# Patient Record
Sex: MALE | Race: WHITE | NOT HISPANIC OR LATINO | Employment: OTHER | URBAN - NONMETROPOLITAN AREA
[De-identification: names, ages, dates, MRNs, and addresses within clinical notes are randomized per-mention and may not be internally consistent; named-entity substitution may affect disease eponyms.]

---

## 2017-04-11 ENCOUNTER — OFFICE VISIT (OUTPATIENT)
Dept: CARDIOLOGY | Facility: CLINIC | Age: 82
End: 2017-04-11

## 2017-04-11 VITALS
HEIGHT: 70 IN | WEIGHT: 190 LBS | SYSTOLIC BLOOD PRESSURE: 120 MMHG | DIASTOLIC BLOOD PRESSURE: 62 MMHG | HEART RATE: 65 BPM | BODY MASS INDEX: 27.2 KG/M2

## 2017-04-11 DIAGNOSIS — I49.5 SSS (SICK SINUS SYNDROME) (HCC): Primary | ICD-10-CM

## 2017-04-11 DIAGNOSIS — I25.9 IHD (ISCHEMIC HEART DISEASE): ICD-10-CM

## 2017-04-11 DIAGNOSIS — R06.02 SHORTNESS OF BREATH: ICD-10-CM

## 2017-04-11 DIAGNOSIS — E78.49 OTHER HYPERLIPIDEMIA: ICD-10-CM

## 2017-04-11 DIAGNOSIS — I10 ESSENTIAL HYPERTENSION: ICD-10-CM

## 2017-04-11 PROBLEM — E78.5 HYPERLIPEMIA: Status: ACTIVE | Noted: 2017-04-11

## 2017-04-11 PROCEDURE — 93000 ELECTROCARDIOGRAM COMPLETE: CPT | Performed by: NURSE PRACTITIONER

## 2017-04-11 PROCEDURE — 99214 OFFICE O/P EST MOD 30 MIN: CPT | Performed by: NURSE PRACTITIONER

## 2017-04-11 RX ORDER — ASPIRIN 81 MG/1
81 TABLET ORAL DAILY
COMMUNITY
End: 2017-11-09 | Stop reason: ALTCHOICE

## 2017-04-11 RX ORDER — PHENOL 1.4 %
AEROSOL, SPRAY (ML) MUCOUS MEMBRANE DAILY
COMMUNITY
End: 2018-04-17 | Stop reason: ALTCHOICE

## 2017-04-11 RX ORDER — SIMVASTATIN 20 MG
20 TABLET ORAL 2 TIMES DAILY
COMMUNITY
End: 2017-10-17 | Stop reason: ALTCHOICE

## 2017-04-11 NOTE — PROGRESS NOTES
Chief Complaint   Patient presents with   • Follow-up     Denies chest pain or palpitations. Most recent labs from June of last year in patient chart. PCP refills meds.    • Device check     Xtime PPM last checked on 11/09/16.    • Fatigue     Gets tired easily. Has progressively gotten worse.    • Shortness of Breath     With exertion. About the same as before.        Cardiac Complaints  dyspnea and fatigue      Subjective   Collin Coyle is a 81 y.o. male with a history of SVT and sick sinus syndrome for which he underwent ablation by Dr. Canales in 2004. He later underwent pacemaker placement by Dr. Sims in 2011.. He also has ischemic heart disease for which he had stent placement in 2004. His last stress test and echocardiogram in 2015 did not show ischemic burden and his ejection fraction was in normal range.  Most recent pacer check in November showed about 14 mode switches, normal function, and about 2 years of battery life remaining.  He returns today reporting more issues with fatigue.  He says he gets more fatigued and is not able to be as active.  He does also report shortness of breath but states it has been the same for sometime.  No chest pain or palpitations reported.  No recent blood work.  No refills needed.      Cardiac History  Past Surgical History:   Procedure Laterality Date   • CARDIAC ABLATION  2004    Dr. Canales   • CARDIAC CATHETERIZATION  06/29/2004    70% LAD. RTA & 2.5x20, 3.0x20 taxus in LAD   • CARDIOVASCULAR STRESS TEST  04/11/2007    7 min, 93% THR. Negative   • CARDIOVASCULAR STRESS TEST  07/14/2009    7 min, 89% THR. Negative   • CARDIOVASCULAR STRESS TEST  09/12/2012    6 min, 97% THR, 140/72, small inferior infarct   • CARDIOVASCULAR STRESS TEST  10/19/2015    EF 65%, no ischemic burden   • CONVERTED (HISTORICAL) HOLTER  07/11/2003    NSR-72 bpm, one short run of SVT noted   • CONVERTED (HISTORICAL) HOLTER  03/03/2011    AVG HR 59 bpm. 213 pauses   • ECHO -  CONVERTED  10/06/2008    EF >60%, AO root- 4.0cm   • ECHO - CONVERTED  07/14/2009    EF >60%, PA pressure 40.29 mmHg, AO root 4.3 cm   • ECHO - CONVERTED  06/08/2011    EF 50-55%, mild AR and MR, RVSP 40.47 mmHg   • ECHO - CONVERTED  10/19/2015    EF 50-55%, mild MR, mild AR, AO 3.8 cm2   • PACEMAKER IMPLANTATION  03/17/2011    [employer]. Dr. Sims.    • US CAROTID UNILATERAL  04/05/2013    no hemodynamically sig stenosis       Current Outpatient Prescriptions   Medication Sig Dispense Refill   • aspirin 81 MG EC tablet Take 81 mg by mouth Daily.     • Melatonin 10 MG tablet Take  by mouth Daily.     • metoprolol tartrate (LOPRESSOR) 25 MG tablet Take 25 mg by mouth 2 (Two) Times a Day.     • Multiple Vitamin (MULTI VITAMIN PO) Take  by mouth Daily.     • simvastatin (ZOCOR) 20 MG tablet Take 20 mg by mouth 2 (Two) Times a Day.       No current facility-administered medications for this visit.        Review of patient's allergies indicates no known allergies.    Past Medical History:   Diagnosis Date   • H/O cataract extraction     bilateral   • H/O hernia repair    • Hx of appendectomy    • Hypercholesterolemia    • Hyperlipidemia    • Hypertension    • IHD (ischemic heart disease)        Social History     Social History   • Marital status:      Spouse name: N/A   • Number of children: N/A   • Years of education: N/A     Occupational History   • Not on file.     Social History Main Topics   • Smoking status: Former Smoker     Quit date: 1963   • Smokeless tobacco: Never Used   • Alcohol use No   • Drug use: No   • Sexual activity: Not on file     Other Topics Concern   • Not on file     Social History Narrative       Family History   Problem Relation Age of Onset   • Heart disease Mother    • Hyperlipidemia Mother    • Hypertension Mother    • Cancer Father    • Heart disease Sister        Review of Systems   Constitutional: Positive for fatigue.   HENT: Negative.    Respiratory: Positive for  "shortness of breath.    Cardiovascular: Negative.    Musculoskeletal: Negative.    Skin: Negative.    Neurological: Negative.    Psychiatric/Behavioral: Negative.        DiabetesNo  Thyroidnormal    Objective     /62  Pulse 65  Ht 70\" (177.8 cm)  Wt 190 lb (86.2 kg)  BMI 27.26 kg/m2    Physical Exam   Constitutional: He is oriented to person, place, and time. He appears well-developed and well-nourished.   HENT:   Head: Normocephalic and atraumatic.   Eyes: EOM are normal. Pupils are equal, round, and reactive to light.   Neck: Normal range of motion. Neck supple.   Cardiovascular: Normal rate and regular rhythm.    Murmur heard.  Pulmonary/Chest: Effort normal and breath sounds normal.   Abdominal: Soft.   Musculoskeletal: Normal range of motion.   Neurological: He is alert and oriented to person, place, and time.   Skin: Skin is warm and dry.   Psychiatric: He has a normal mood and affect.         ECG 12 Lead  Date/Time: 4/11/2017 1:56 PM  Performed by: ARIANNE LONGO  Authorized by: ARIANNE LONGO   Rhythm: paced  BPM: 65  Clinical impression: abnormal ECG          Assessment/Plan     HR is stable today as well as blood pressure.  EKG done for SSS and PPM shows AV pacing.  We will advise on repeat pacer check for next available.  Echo will be advised to re-assess LV function and for any valvular concerns that may be causing shortness of breath or fatigue.  Labs with you, could we get next copy?  No refills are needed. More recommendations to follow testing.  Good cardiac diet and activity as tolerated advised.  6 month follow up advised or sooner if needed.      Problems Addressed this Visit        Cardiovascular and Mediastinum    IHD (ischemic heart disease)    Relevant Medications    metoprolol tartrate (LOPRESSOR) 25 MG tablet    Other Relevant Orders    ECG 12 Lead    Adult Transthoracic Echo Complete    SSS (sick sinus syndrome) - Primary    Relevant Medications    metoprolol tartrate " (LOPRESSOR) 25 MG tablet    Other Relevant Orders    ECG 12 Lead    HTN (hypertension)    Relevant Medications    metoprolol tartrate (LOPRESSOR) 25 MG tablet    Hyperlipemia    Relevant Medications    simvastatin (ZOCOR) 20 MG tablet      Other Visit Diagnoses     Shortness of breath        Relevant Orders    Adult Transthoracic Echo Complete              Electronically signed by NANO Pitt April 11, 2017 5:24 PM

## 2017-04-17 ENCOUNTER — OUTSIDE FACILITY SERVICE (OUTPATIENT)
Dept: CARDIOLOGY | Facility: CLINIC | Age: 82
End: 2017-04-17

## 2017-04-17 ENCOUNTER — HOSPITAL ENCOUNTER (OUTPATIENT)
Dept: CARDIOLOGY | Facility: HOSPITAL | Age: 82
Discharge: HOME | End: 2017-04-17
Admitting: NURSE PRACTITIONER

## 2017-04-17 DIAGNOSIS — I25.9 IHD (ISCHEMIC HEART DISEASE): ICD-10-CM

## 2017-04-17 DIAGNOSIS — R06.02 SHORTNESS OF BREATH: ICD-10-CM

## 2017-04-17 PROCEDURE — 93306 TTE W/DOPPLER COMPLETE: CPT

## 2017-04-17 PROCEDURE — 93306 TTE W/DOPPLER COMPLETE: CPT | Performed by: INTERNAL MEDICINE

## 2017-04-26 ENCOUNTER — OFFICE VISIT (OUTPATIENT)
Dept: CARDIOLOGY | Facility: CLINIC | Age: 82
End: 2017-04-26

## 2017-04-26 DIAGNOSIS — I25.9 IHD (ISCHEMIC HEART DISEASE): Primary | ICD-10-CM

## 2017-04-26 DIAGNOSIS — I49.5 SSS (SICK SINUS SYNDROME) (HCC): ICD-10-CM

## 2017-04-26 PROCEDURE — 93288 INTERROG EVL PM/LDLS PM IP: CPT | Performed by: INTERNAL MEDICINE

## 2017-10-17 ENCOUNTER — OFFICE VISIT (OUTPATIENT)
Dept: CARDIOLOGY | Facility: CLINIC | Age: 82
End: 2017-10-17

## 2017-10-17 VITALS
DIASTOLIC BLOOD PRESSURE: 80 MMHG | HEART RATE: 65 BPM | WEIGHT: 185 LBS | HEIGHT: 70 IN | SYSTOLIC BLOOD PRESSURE: 122 MMHG | BODY MASS INDEX: 26.48 KG/M2

## 2017-10-17 DIAGNOSIS — Z98.890 H/O CARDIAC RADIOFREQUENCY ABLATION: ICD-10-CM

## 2017-10-17 DIAGNOSIS — R01.1 CARDIAC MURMUR: ICD-10-CM

## 2017-10-17 DIAGNOSIS — I10 ESSENTIAL HYPERTENSION: ICD-10-CM

## 2017-10-17 DIAGNOSIS — I25.9 IHD (ISCHEMIC HEART DISEASE): Primary | ICD-10-CM

## 2017-10-17 DIAGNOSIS — E78.49 OTHER HYPERLIPIDEMIA: ICD-10-CM

## 2017-10-17 DIAGNOSIS — Z78.9 PACED RHYTHM ON ELECTROCARDIOGRAM (ECG): ICD-10-CM

## 2017-10-17 DIAGNOSIS — Z95.0 PACEMAKER: ICD-10-CM

## 2017-10-17 DIAGNOSIS — I49.5 SSS (SICK SINUS SYNDROME) (HCC): ICD-10-CM

## 2017-10-17 DIAGNOSIS — R06.02 SHORTNESS OF BREATH: ICD-10-CM

## 2017-10-17 PROCEDURE — 93000 ELECTROCARDIOGRAM COMPLETE: CPT | Performed by: NURSE PRACTITIONER

## 2017-10-17 PROCEDURE — 99214 OFFICE O/P EST MOD 30 MIN: CPT | Performed by: NURSE PRACTITIONER

## 2017-10-17 RX ORDER — SIMVASTATIN 20 MG
20 TABLET ORAL NIGHTLY
COMMUNITY
End: 2018-04-17 | Stop reason: SDUPTHER

## 2017-10-17 NOTE — PROGRESS NOTES
Chief Complaint   Patient presents with   • Follow-up     For cardiac management. Says he occasionally gets short of breath.    • Med Refill     Does not need refills.        Geena Coyle is a 82 y.o. male with a history of SVT and sick sinus syndrome for which he underwent ablation by Dr. Canales in 2004. He later underwent pacemaker placement by Dr. Sims in 2011.. He also has ischemic heart disease for which he had stent placement in 2004. His l stress test and echocardiogram in 2015 did not show ischemic burden and his ejection fraction was in normal range.  Pacer check in April 2017 showed normal function with no events and 1.5  years of battery life remaining.   Today he comes to the office for a follow up visit and denies chest pain. He has shortness of breath with exertion, no worse. He continues his normal activities without new or worsening symptoms. No recent medication changes reported.     HPI         Cardiac History:    Past Surgical History:   Procedure Laterality Date   • CARDIAC ABLATION  2004    Dr. Canales   • CARDIAC CATHETERIZATION  06/29/2004    70% LAD. RTA & 2.5x20, 3.0x20 taxus in LAD   • CARDIOVASCULAR STRESS TEST  04/11/2007    7 min, 93% THR. Negative   • CARDIOVASCULAR STRESS TEST  07/14/2009    7 min, 89% THR. Negative   • CARDIOVASCULAR STRESS TEST  09/12/2012    6 min, 97% THR, 140/72, small inferior infarct   • CARDIOVASCULAR STRESS TEST  10/19/2015    EF 65%, no ischemic burden   • CONVERTED (HISTORICAL) HOLTER  07/11/2003    NSR-72 bpm, one short run of SVT noted   • CONVERTED (HISTORICAL) HOLTER  03/03/2011    AVG HR 59 bpm. 213 pauses   • ECHO - CONVERTED  10/06/2008    EF >60%, AO root- 4.0cm   • ECHO - CONVERTED  07/14/2009    EF >60%, PA pressure 40.29 mmHg, AO root 4.3 cm   • ECHO - CONVERTED  06/08/2011    EF 50-55%, mild AR and MR, RVSP 40.47 mmHg   • ECHO - CONVERTED  10/19/2015    EF 50-55%, mild MR, mild AR, AO 3.8 cm2   • ECHO - CONVERTED   04/17/2017    EF 50-55%, mild MR, RVSP 26 mmHg   • PACEMAKER IMPLANTATION  03/17/2011    [employer]. Dr. Sims.    • US CAROTID UNILATERAL  04/05/2013    no hemodynamically sig stenosis       Current Outpatient Prescriptions   Medication Sig Dispense Refill   • Ascorbic Acid (VITAMIN C PO) Take 200 mg by mouth Daily.     • aspirin 81 MG EC tablet Take 81 mg by mouth Daily.     • Melatonin 10 MG tablet Take  by mouth Daily.     • metoprolol tartrate (LOPRESSOR) 25 MG tablet Take 25 mg by mouth 2 (Two) Times a Day.     • Multiple Vitamin (MULTI VITAMIN PO) Take  by mouth Daily.     • simvastatin (ZOCOR) 20 MG tablet Take 20 mg by mouth Every Night.       No current facility-administered medications for this visit.        Review of patient's allergies indicates no known allergies.    Past Medical History:   Diagnosis Date   • H/O cataract extraction     bilateral   • H/O hernia repair    • Hx of appendectomy    • Hypercholesterolemia    • Hyperlipidemia    • Hypertension    • IHD (ischemic heart disease)        Social History     Social History   • Marital status:      Spouse name: N/A   • Number of children: N/A   • Years of education: N/A     Occupational History   • Not on file.     Social History Main Topics   • Smoking status: Former Smoker     Quit date: 1963   • Smokeless tobacco: Never Used   • Alcohol use No   • Drug use: No   • Sexual activity: Not on file     Other Topics Concern   • Not on file     Social History Narrative       Family History   Problem Relation Age of Onset   • Heart disease Mother    • Hyperlipidemia Mother    • Hypertension Mother    • Cancer Father    • Heart disease Sister        Review of Systems   Constitution: Negative for decreased appetite and malaise/fatigue.   HENT: Negative for congestion and sore throat.    Eyes: Negative for blurred vision.   Cardiovascular: Negative for chest pain, claudication, dyspnea on exertion, leg swelling, near-syncope, palpitations and  "paroxysmal nocturnal dyspnea.   Respiratory: Positive for shortness of breath. Negative for cough and sleep disturbances due to breathing.    Endocrine: Negative for cold intolerance and heat intolerance.   Hematologic/Lymphatic: Negative for adenopathy. Does not bruise/bleed easily.   Skin: Negative for itching, nail changes and skin cancer.   Musculoskeletal: Negative for arthritis and myalgias.   Gastrointestinal: Negative for abdominal pain, dysphagia, heartburn and melena.   Genitourinary: Negative for dysuria and hematuria.   Neurological: Negative for dizziness, light-headedness, seizures and vertigo.   Psychiatric/Behavioral: Negative for altered mental status and memory loss. The patient does not have insomnia.    Allergic/Immunologic: Negative for environmental allergies and hives.        Diabetes- No  Thyroid-normal    Objective     /80 (BP Location: Left arm)  Pulse 65  Ht 70\" (177.8 cm)  Wt 185 lb (83.9 kg)  BMI 26.54 kg/m2    Physical Exam   Constitutional: He is oriented to person, place, and time. He appears well-nourished.   HENT:   Head: Normocephalic.   Eyes: Conjunctivae are normal. Pupils are equal, round, and reactive to light.   Neck: Normal range of motion. Neck supple. No JVD present.   Cardiovascular: Normal rate, regular rhythm, S1 normal, S2 normal and normal pulses.    Murmur heard.   Blowing holosystolic murmur is present with a grade of 2/6   Pulmonary/Chest: Effort normal and breath sounds normal. He has no wheezes. He has no rales.   Abdominal: Soft. Bowel sounds are normal. He exhibits no distension. There is no tenderness.   Musculoskeletal: Normal range of motion. He exhibits no edema.   Neurological: He is alert and oriented to person, place, and time.   Skin: Skin is warm and dry. No rash noted. No pallor.   Psychiatric: He has a normal mood and affect. His behavior is normal. Judgment and thought content normal.   Vitals reviewed.     ECG 12 Lead  Date/Time: " 10/17/2017 8:53 AM  Performed by: MASON TEJEDA  Authorized by: MASON TEJEDA   Comparison: compared with previous ECG from 2017  Comparison to previous ECG: Atrial and ventricular paced  Rhythm: paced  Rate: normal  BPM: 65  Pacin% capture  Clinical impression: abnormal ECG  Comments: A-V paced                Assessment/Plan      Collin was seen today for follow-up and med refill.    Diagnoses and all orders for this visit:    IHD (ischemic heart disease)    SSS (sick sinus syndrome)    Essential hypertension    Other hyperlipidemia    Pacemaker    Paced rhythm on electrocardiogram (ECG)    Shortness of breath    H/O cardiac radiofrequency ablation    Cardiac murmur    Other orders  -     ECG 12 Lead      For management of pacemaker an EKG done today that shows atrial and ventricular pacing. A Single Digits ppm interrogation scheduled for next month. His blood pressure is normal. He brought a copy of April lab report which shows normal electrolytes and blood count. Lipids are at goal. Same cardiac medications recommended. His BMI is 26. Goal weight 180. I encouraged him on 5-10 pound weight loss and low fat diet. He maintains good activity and I encouraged him to continue. The report of his most recent echocardiogram done in April was reviewed which showed LV ejection fraction remains 50-55^% and PA pressure improved being 26. No further cardiac testing advised at this time. Should any problems or symptoms develop he understands to call.              Electronically signed by NANO Arzate,  2017 11:10 AM

## 2017-11-08 ENCOUNTER — OFFICE VISIT (OUTPATIENT)
Dept: CARDIOLOGY | Facility: CLINIC | Age: 82
End: 2017-11-08

## 2017-11-08 DIAGNOSIS — I49.5 SSS (SICK SINUS SYNDROME) (HCC): ICD-10-CM

## 2017-11-08 DIAGNOSIS — I48.0 PAF (PAROXYSMAL ATRIAL FIBRILLATION) (HCC): Primary | ICD-10-CM

## 2017-11-08 PROCEDURE — 93288 INTERROG EVL PM/LDLS PM IP: CPT | Performed by: INTERNAL MEDICINE

## 2017-11-09 ENCOUNTER — TELEPHONE (OUTPATIENT)
Dept: CARDIOLOGY | Facility: CLINIC | Age: 82
End: 2017-11-09

## 2017-11-09 NOTE — TELEPHONE ENCOUNTER
Pt here for a PPM check, was in AFib, per Cheryle De Guzman, APRN, start Eliquis 5 mg BID, stop ASA and come in for EKG Friday.  Pt aware. Samples given.

## 2017-11-10 ENCOUNTER — TELEPHONE (OUTPATIENT)
Dept: CARDIOLOGY | Facility: CLINIC | Age: 82
End: 2017-11-10

## 2017-11-10 ENCOUNTER — CLINICAL SUPPORT (OUTPATIENT)
Dept: CARDIOLOGY | Facility: CLINIC | Age: 82
End: 2017-11-10

## 2017-11-10 DIAGNOSIS — Z79.899 MEDICATION MANAGEMENT: ICD-10-CM

## 2017-11-10 DIAGNOSIS — Z95.0 PACEMAKER: Primary | ICD-10-CM

## 2017-11-10 DIAGNOSIS — I48.0 PAF (PAROXYSMAL ATRIAL FIBRILLATION) (HCC): ICD-10-CM

## 2017-11-10 PROCEDURE — 93000 ELECTROCARDIOGRAM COMPLETE: CPT | Performed by: NURSE PRACTITIONER

## 2017-11-10 NOTE — PROGRESS NOTES
Procedure     ECG 12 Lead  Date/Time: 11/10/2017 10:32 AM  Performed by: MASON TEJEDA  Authorized by: MASON TEJEDA   Rhythm: paced  BPM: 65  Comments: Atrial and ventricular pacing

## 2017-11-10 NOTE — TELEPHONE ENCOUNTER
Mr. Coyle had ppm check Wednesday that showed PAF. Eliquis started.   Follow up EKG today shows A-V pacing. /80.  Continue same?

## 2017-11-27 ENCOUNTER — TELEPHONE (OUTPATIENT)
Dept: CARDIOLOGY | Facility: CLINIC | Age: 82
End: 2017-11-27

## 2017-11-27 NOTE — TELEPHONE ENCOUNTER
Received request from patient's spouse for Samples of Eliquis 5mg bid. Samples of Eliquis 5mg # 28 tablet's prepared for patient to .

## 2017-12-18 ENCOUNTER — TELEPHONE (OUTPATIENT)
Dept: CARDIOLOGY | Facility: CLINIC | Age: 82
End: 2017-12-18

## 2017-12-18 NOTE — TELEPHONE ENCOUNTER
Patient requesting samples of Eliquis 5mg bid and requesting refill to pharmacy on Eliquis 5mg bid. Samples of Eliquis 5mg ready for -42 tablets and script for Eliquis 5mg bid sent to pharmacy.

## 2018-01-05 ENCOUNTER — TELEPHONE (OUTPATIENT)
Dept: CARDIOLOGY | Facility: CLINIC | Age: 83
End: 2018-01-05

## 2018-01-05 NOTE — TELEPHONE ENCOUNTER
Patient's wife called requesting samples of Eliquis 5 mg BID. 28 were prepared and are ready for .

## 2018-01-29 ENCOUNTER — TELEPHONE (OUTPATIENT)
Dept: CARDIOLOGY | Facility: CLINIC | Age: 83
End: 2018-01-29

## 2018-01-29 NOTE — TELEPHONE ENCOUNTER
Patient called requesting samples of Eliquis 5mg bid, samples of Eliquis 5mg ready for pickup-28 tablets.

## 2018-02-26 ENCOUNTER — TELEPHONE (OUTPATIENT)
Dept: CARDIOLOGY | Facility: CLINIC | Age: 83
End: 2018-02-26

## 2018-02-26 NOTE — TELEPHONE ENCOUNTER
Patient called requesting refill on Eliquis 5mg bid sent to [facility] mail order pharmacy. 90 day Refill on Eliquis 5mg bid sent to pharmacy.

## 2018-04-09 ENCOUNTER — TELEPHONE (OUTPATIENT)
Dept: CARDIOLOGY | Facility: CLINIC | Age: 83
End: 2018-04-09

## 2018-04-13 NOTE — PROGRESS NOTES
Chief Complaint   Patient presents with   • Follow-up     For cardiac management. Last lab work was done in 01/08/18 per PCP, in chart under media.   • Med Refill     Needs refills on cardiac medications. 90 day supplys to [facility].    • Pacemaker Check     Last [facility] PPM check on 11/08/17 per our office.        Cardiac Complaints  none      Subjective   Collin Coyle is a 82 y.o. male with HTN, hyperlipidemia, PAF,  SVT, and sick sinus syndrome for which he underwent ablation by Dr. Canales in 2004. He later underwent pacemaker placement by Dr. Sims in 2011. For ischemic heart disease for which he had stent placement in 2004. His stress test in 2015 showed no ischemic burden.  Most recent echo in 2017 showed good LV function and only mild MR.  Pacer check in November of 2017 showed 5 episodes of AMS and eliquis was added, one year of battery life remaining.  Most recent labs from January 2018 show H/H 14.2/44.9, creatinine 1.01, K 4.2, Na 145, LDL 51, and HDL 43.  He returns today for follow up and denies any cardiac concerns.  He denies any new concerns.  He does report some shortness of breath with exertion same as it has been like this for years.  Refills of cardiac meds requested for 90 day supply.        Cardiac History  Past Surgical History:   Procedure Laterality Date   • CARDIAC ABLATION  2004    Dr. Canales   • CARDIAC CATHETERIZATION  06/29/2004    70% LAD. RTA & 2.5x20, 3.0x20 taxus in LAD   • CARDIOVASCULAR STRESS TEST  04/11/2007    7 min, 93% THR. Negative   • CARDIOVASCULAR STRESS TEST  07/14/2009    7 min, 89% THR. Negative   • CARDIOVASCULAR STRESS TEST  09/12/2012    6 min, 97% THR, 140/72, small inferior infarct   • CARDIOVASCULAR STRESS TEST  10/19/2015    EF 65%, no ischemic burden   • CONVERTED (HISTORICAL) HOLTER  07/11/2003    NSR-72 bpm, one short run of SVT noted   • CONVERTED (HISTORICAL) HOLTER  03/03/2011    AVG HR 59 bpm. 213 pauses   • ECHO - CONVERTED  10/06/2008    EF  >60%, AO root- 4.0cm   • ECHO - CONVERTED  07/14/2009    EF >60%, PA pressure 40.29 mmHg, AO root 4.3 cm   • ECHO - CONVERTED  06/08/2011    EF 50-55%, mild AR and MR, RVSP 40.47 mmHg   • ECHO - CONVERTED  10/19/2015    EF 50-55%, mild MR, mild AR, AO 3.8 cm2   • ECHO - CONVERTED  04/17/2017    EF 50-55%, mild MR, RVSP 26 mmHg   • PACEMAKER IMPLANTATION  03/17/2011    [employer]. Dr. Sims.    • US CAROTID UNILATERAL  04/05/2013    no hemodynamically sig stenosis       Current Outpatient Prescriptions   Medication Sig Dispense Refill   • Ascorbic Acid (VITAMIN C PO) Take 200 mg by mouth Daily.     • metoprolol tartrate (LOPRESSOR) 25 MG tablet Take 1 tablet by mouth Every 12 (Twelve) Hours. 180 tablet 3   • Multiple Vitamin (MULTI VITAMIN PO) Take  by mouth Daily.     • simvastatin (ZOCOR) 20 MG tablet Take 1 tablet by mouth Every Night. 90 tablet 3   • apixaban (ELIQUIS) 5 MG tablet tablet Take 1 tablet by mouth 2 (Two) Times a Day. 28 tablet 0     No current facility-administered medications for this visit.        Review of patient's allergies indicates no known allergies.    Past Medical History:   Diagnosis Date   • H/O cataract extraction     bilateral   • H/O hernia repair    • Hx of appendectomy    • Hypercholesterolemia    • Hyperlipidemia    • Hypertension    • IHD (ischemic heart disease)        Social History     Social History   • Marital status:      Spouse name: N/A   • Number of children: N/A   • Years of education: N/A     Occupational History   • Not on file.     Social History Main Topics   • Smoking status: Former Smoker     Quit date: 1963   • Smokeless tobacco: Never Used   • Alcohol use No   • Drug use: No   • Sexual activity: Not on file     Other Topics Concern   • Not on file     Social History Narrative   • No narrative on file       Family History   Problem Relation Age of Onset   • Heart disease Mother    • Hyperlipidemia Mother    • Hypertension Mother    • Cancer Father   "  • Heart disease Sister        Review of Systems   Constitution: Negative for weakness and malaise/fatigue.   Cardiovascular: Negative for chest pain, claudication, dyspnea on exertion, irregular heartbeat, near-syncope, palpitations and syncope.   Respiratory: Negative for shortness of breath and wheezing.    Musculoskeletal: Negative for back pain, joint pain and joint swelling.   Gastrointestinal: Negative for anorexia, heartburn and nausea.   Genitourinary: Negative for dysuria, hesitancy and nocturia.   Neurological: Negative for dizziness, light-headedness and loss of balance.   Psychiatric/Behavioral: Negative for depression and memory loss. The patient is not nervous/anxious.            Objective     /72 (BP Location: Left arm)   Pulse 65   Ht 177.8 cm (70\")   Wt 83 kg (183 lb)   BMI 26.26 kg/m²     Physical Exam   Constitutional: He is oriented to person, place, and time. He appears well-developed and well-nourished.   HENT:   Head: Normocephalic and atraumatic.   Eyes: EOM are normal. Pupils are equal, round, and reactive to light.   Neck: Normal range of motion. Neck supple.   Cardiovascular: Normal rate and regular rhythm.    Murmur heard.  Pulmonary/Chest: Effort normal and breath sounds normal.   Abdominal: Soft.   Musculoskeletal: Normal range of motion.   Neurological: He is alert and oriented to person, place, and time.   Skin: Skin is warm and dry.   Psychiatric: He has a normal mood and affect. His behavior is normal.         ECG 12 Lead  Date/Time: 4/17/2018 10:06 AM  Performed by: ARIANNE LONGO  Authorized by: ARIANNE LONGO   Rhythm: paced  BPM: 65  Clinical impression: abnormal ECG  Comments: Normal QT            Assessment/Plan     HR is stable today.  HTN well controlled on current.  EKG done today for SSS and PPM shows AV pacing and normal QT.  Palpitations are denied. Eliquis recently added for what appeared to be atrial fibrillation on most recent pacer check from " November. Patient denies any bleeding/bruising since addition of eliquis therapy.  Most recent labs reviewed, thank you for most recent copy.  Current zocor dosing will be continued for hyperlipidemia management. Refills of cardiac meds sent per request.  Repeat [place] PPM check advised.  Most recent echo findings from 2017 discussed with patient.  No new cardiac workup advised today as no new concerns are voiced and cardiac status appears stable.  BMI slightly elevated at 26.  Good cardiac diet with limited caloric intake and walking regimen encouraged.  6 month follow up scheduled or sooner if needed.      Problems Addressed this Visit        Cardiovascular and Mediastinum    IHD (ischemic heart disease)    Relevant Medications    metoprolol tartrate (LOPRESSOR) 25 MG tablet    SSS (sick sinus syndrome)    Relevant Medications    metoprolol tartrate (LOPRESSOR) 25 MG tablet    Other Relevant Orders    ECG 12 Lead    HTN (hypertension)    Relevant Medications    metoprolol tartrate (LOPRESSOR) 25 MG tablet    Hyperlipemia    Relevant Medications    simvastatin (ZOCOR) 20 MG tablet    Pacemaker    PAF (paroxysmal atrial fibrillation) - Primary    Relevant Medications    metoprolol tartrate (LOPRESSOR) 25 MG tablet    Other Relevant Orders    ECG 12 Lead      Other Visit Diagnoses     Overweight              Patient's Body mass index is 26.26 kg/m². BMI is above normal parameters. Follow-up plan includes:  nutrition counseling.              Electronically signed by NANO Pitt April 17, 2018 2:36 PM

## 2018-04-17 ENCOUNTER — TELEPHONE (OUTPATIENT)
Dept: CARDIOLOGY | Facility: CLINIC | Age: 83
End: 2018-04-17

## 2018-04-17 ENCOUNTER — OFFICE VISIT (OUTPATIENT)
Dept: CARDIOLOGY | Facility: CLINIC | Age: 83
End: 2018-04-17

## 2018-04-17 VITALS
DIASTOLIC BLOOD PRESSURE: 72 MMHG | HEIGHT: 70 IN | HEART RATE: 65 BPM | WEIGHT: 183 LBS | SYSTOLIC BLOOD PRESSURE: 130 MMHG | BODY MASS INDEX: 26.2 KG/M2

## 2018-04-17 DIAGNOSIS — I49.5 SSS (SICK SINUS SYNDROME) (HCC): ICD-10-CM

## 2018-04-17 DIAGNOSIS — E66.3 OVERWEIGHT: ICD-10-CM

## 2018-04-17 DIAGNOSIS — I25.9 IHD (ISCHEMIC HEART DISEASE): ICD-10-CM

## 2018-04-17 DIAGNOSIS — I10 ESSENTIAL HYPERTENSION: ICD-10-CM

## 2018-04-17 DIAGNOSIS — I48.0 PAF (PAROXYSMAL ATRIAL FIBRILLATION) (HCC): Primary | ICD-10-CM

## 2018-04-17 DIAGNOSIS — Z95.0 PACEMAKER: ICD-10-CM

## 2018-04-17 DIAGNOSIS — E78.00 PURE HYPERCHOLESTEROLEMIA: ICD-10-CM

## 2018-04-17 PROCEDURE — 99214 OFFICE O/P EST MOD 30 MIN: CPT | Performed by: NURSE PRACTITIONER

## 2018-04-17 PROCEDURE — 93000 ELECTROCARDIOGRAM COMPLETE: CPT | Performed by: NURSE PRACTITIONER

## 2018-04-17 RX ORDER — SIMVASTATIN 20 MG
20 TABLET ORAL NIGHTLY
Qty: 90 TABLET | Refills: 3 | Status: SHIPPED | OUTPATIENT
Start: 2018-04-17 | End: 2019-04-16 | Stop reason: SDUPTHER

## 2018-04-17 NOTE — TELEPHONE ENCOUNTER
Patient requested samples of Eliquis 5 mg BID when he was seen for office visit today. 28 tablets were given.

## 2018-04-25 ENCOUNTER — OFFICE VISIT (OUTPATIENT)
Dept: CARDIOLOGY | Facility: CLINIC | Age: 83
End: 2018-04-25

## 2018-04-25 DIAGNOSIS — I25.9 IHD (ISCHEMIC HEART DISEASE): Primary | ICD-10-CM

## 2018-04-25 DIAGNOSIS — I49.5 SSS (SICK SINUS SYNDROME) (HCC): ICD-10-CM

## 2018-04-25 PROCEDURE — 93280 PM DEVICE PROGR EVAL DUAL: CPT | Performed by: INTERNAL MEDICINE

## 2018-04-27 ENCOUNTER — TELEPHONE (OUTPATIENT)
Dept: CARDIOLOGY | Facility: CLINIC | Age: 83
End: 2018-04-27

## 2018-04-27 NOTE — TELEPHONE ENCOUNTER
[facility] pharmacy requesting clarification of Metoprolol tartrate 25mg or 50mg 1/2 tablet. Clarification script for Metoprolol tartrate 25mg every 12 hour sent to pharmacy.

## 2018-05-29 ENCOUNTER — TELEPHONE (OUTPATIENT)
Dept: CARDIOLOGY | Facility: CLINIC | Age: 83
End: 2018-05-29

## 2018-07-06 ENCOUNTER — TELEPHONE (OUTPATIENT)
Dept: CARDIOLOGY | Facility: CLINIC | Age: 83
End: 2018-07-06

## 2018-07-27 ENCOUNTER — OFFICE VISIT (OUTPATIENT)
Dept: CARDIOLOGY | Facility: CLINIC | Age: 83
End: 2018-07-27

## 2018-07-27 DIAGNOSIS — I49.5 SSS (SICK SINUS SYNDROME) (HCC): Primary | ICD-10-CM

## 2018-07-27 DIAGNOSIS — I48.0 PAF (PAROXYSMAL ATRIAL FIBRILLATION) (HCC): ICD-10-CM

## 2018-07-27 PROCEDURE — 93288 INTERROG EVL PM/LDLS PM IP: CPT | Performed by: INTERNAL MEDICINE

## 2018-08-14 ENCOUNTER — TELEPHONE (OUTPATIENT)
Dept: CARDIOLOGY | Facility: CLINIC | Age: 83
End: 2018-08-14

## 2018-09-24 ENCOUNTER — TELEPHONE (OUTPATIENT)
Dept: CARDIOLOGY | Facility: CLINIC | Age: 83
End: 2018-09-24

## 2018-10-16 ENCOUNTER — OFFICE VISIT (OUTPATIENT)
Dept: CARDIOLOGY | Facility: CLINIC | Age: 83
End: 2018-10-16

## 2018-10-16 ENCOUNTER — TELEPHONE (OUTPATIENT)
Dept: CARDIOLOGY | Facility: CLINIC | Age: 83
End: 2018-10-16

## 2018-10-16 VITALS
HEART RATE: 65 BPM | WEIGHT: 178 LBS | DIASTOLIC BLOOD PRESSURE: 70 MMHG | HEIGHT: 70 IN | BODY MASS INDEX: 25.48 KG/M2 | SYSTOLIC BLOOD PRESSURE: 110 MMHG

## 2018-10-16 DIAGNOSIS — I25.9 IHD (ISCHEMIC HEART DISEASE): Primary | ICD-10-CM

## 2018-10-16 DIAGNOSIS — I48.0 PAF (PAROXYSMAL ATRIAL FIBRILLATION) (HCC): ICD-10-CM

## 2018-10-16 DIAGNOSIS — I10 ESSENTIAL HYPERTENSION: ICD-10-CM

## 2018-10-16 DIAGNOSIS — Z98.890 H/O CARDIAC RADIOFREQUENCY ABLATION: ICD-10-CM

## 2018-10-16 DIAGNOSIS — Z95.0 PACEMAKER: ICD-10-CM

## 2018-10-16 DIAGNOSIS — E78.00 PURE HYPERCHOLESTEROLEMIA: ICD-10-CM

## 2018-10-16 DIAGNOSIS — I49.5 SSS (SICK SINUS SYNDROME) (HCC): ICD-10-CM

## 2018-10-16 PROCEDURE — 99214 OFFICE O/P EST MOD 30 MIN: CPT | Performed by: NURSE PRACTITIONER

## 2018-10-16 PROCEDURE — 93000 ELECTROCARDIOGRAM COMPLETE: CPT | Performed by: NURSE PRACTITIONER

## 2018-10-16 NOTE — PROGRESS NOTES
"Chief Complaint   Patient presents with   • Follow-up     Cardiac management. Has copy of most recent labs. [facility] He states \"had been having a lot of gas problems and bloating\".   • Pacemaker Check     Last [facility] [place] check 7/27/18.   • Shortness of Breath     Nothing any more than had before, he relates to age.       Geena Coyle is an 83 y.o. male with HTN, hyperlipidemia, PAF, SVT, and SSS for which he underwent ablation by Dr. Canales in 2004. He later underwent pacemaker placement by Dr. Sims in 2011. For IHD, he underwent stent placement in 2004. His stress test in 2015 showed no ischemia. Most recent echo in 2017 showed good LV function and only mild MR. Pacer check in November of 2017 showed 5 episodes of AMS and Eliquis was added, one year of battery life remaining. Pacer check repeated in April and again in July 2018 showed battery life remains around 1 year. He did have a few tachy events. He is on a three month check now. He came in today for his regular follow up visit. He denies chest pain, shortness of breath, palpitations or racing. His chief complaint and lower GI gas and bloating. He denies abdominal pain, nausea, vomiting, melena. He underwent US of abdomen which showed small stones without cholecystitis. Colonoscopy done one year ago with one polyp. Labs on 9/17/18 showed lipids well controlled at 120, Tri 63, LDL 62, HDL 45. H/H 14.3/43.3, normal CMP.   HPI         Cardiac History:    Past Surgical History:   Procedure Laterality Date   • CARDIAC ABLATION  2004    Dr. Canales   • CARDIAC CATHETERIZATION  06/29/2004    70% LAD. RTA & 2.5x20, 3.0x20 taxus in LAD   • CARDIOVASCULAR STRESS TEST  04/11/2007    7 min, 93% THR. Negative   • CARDIOVASCULAR STRESS TEST  07/14/2009    7 min, 89% THR. Negative   • CARDIOVASCULAR STRESS TEST  09/12/2012    6 min, 97% THR, 140/72, small inferior infarct   • CARDIOVASCULAR STRESS TEST  10/19/2015    EF 65%, no " ischemic burden   • CONVERTED (HISTORICAL) HOLTER  07/11/2003    NSR-72 bpm, one short run of SVT noted   • CONVERTED (HISTORICAL) HOLTER  03/03/2011    AVG HR 59 bpm. 213 pauses   • ECHO - CONVERTED  10/06/2008    EF >60%, AO root- 4.0cm   • ECHO - CONVERTED  07/14/2009    EF >60%, PA pressure 40.29 mmHg, AO root 4.3 cm   • ECHO - CONVERTED  06/08/2011    EF 50-55%, mild AR and MR, RVSP 40.47 mmHg   • ECHO - CONVERTED  10/19/2015    EF 50-55%, mild MR, mild AR, AO 3.8 cm2   • ECHO - CONVERTED  04/17/2017    EF 50-55%, mild MR, RVSP 26 mmHg   • PACEMAKER IMPLANTATION  03/17/2011    [employer]. Dr. Sims.    • US CAROTID UNILATERAL  04/05/2013    no hemodynamically sig stenosis       Current Outpatient Prescriptions   Medication Sig Dispense Refill   • apixaban (ELIQUIS) 5 MG tablet tablet Take 1 tablet by mouth 2 (Two) Times a Day. 56 tablet 0   • metoprolol tartrate (LOPRESSOR) 25 MG tablet Take 1 tablet by mouth Every 12 (Twelve) Hours. 180 tablet 3   • Multiple Vitamin (MULTI VITAMIN PO) Take  by mouth Daily.     • simvastatin (ZOCOR) 20 MG tablet Take 1 tablet by mouth Every Night. 90 tablet 3     No current facility-administered medications for this visit.        Patient has no known allergies.    Past Medical History:   Diagnosis Date   • Cholelithiasis    • H/O cataract extraction     bilateral   • H/O hernia repair    • Hx of appendectomy    • Hypercholesterolemia    • Hyperlipidemia    • Hypertension    • IHD (ischemic heart disease)        Social History     Social History   • Marital status:      Spouse name: N/A   • Number of children: N/A   • Years of education: N/A     Occupational History   • Not on file.     Social History Main Topics   • Smoking status: Former Smoker     Quit date: 1963   • Smokeless tobacco: Never Used   • Alcohol use No   • Drug use: No   • Sexual activity: Not on file     Other Topics Concern   • Not on file     Social History Narrative   • No narrative on file  "      Family History   Problem Relation Age of Onset   • Heart disease Mother    • Hyperlipidemia Mother    • Hypertension Mother    • Cancer Father    • Heart disease Sister        Review of Systems   Constitution: Positive for weight loss (down 5 lb ). Negative for decreased appetite, weakness and malaise/fatigue.   HENT: Negative.    Eyes: Negative.    Cardiovascular: Negative for chest pain, dyspnea on exertion, leg swelling, orthopnea, palpitations and syncope.   Respiratory: Negative for cough and shortness of breath.    Endocrine: Negative.    Hematologic/Lymphatic: Negative for bleeding problem. Does not bruise/bleed easily.   Skin: Negative.    Musculoskeletal: Negative for falls and myalgias.   Gastrointestinal: Positive for bloating and flatus. Negative for abdominal pain, constipation, diarrhea and melena.   Genitourinary: Negative for dysuria and hematuria.   Neurological: Negative for dizziness.   Psychiatric/Behavioral: Negative for altered mental status and depression.   Allergic/Immunologic: Negative.      Diabetes- No  Thyroid-normal    Objective     /70 (BP Location: Left arm)   Pulse 65   Ht 177.8 cm (70\")   Wt 80.7 kg (178 lb)   BMI 25.54 kg/m²     Physical Exam   Constitutional: He is oriented to person, place, and time. He appears well-developed and well-nourished.   HENT:   Head: Normocephalic.   Eyes: Pupils are equal, round, and reactive to light.   Neck: Normal range of motion.   Cardiovascular: Normal rate, regular rhythm and intact distal pulses.    Pulmonary/Chest: Effort normal and breath sounds normal.   Abdominal: Soft. Bowel sounds are normal.   Musculoskeletal: Normal range of motion. He exhibits no edema.   Neurological: He is alert and oriented to person, place, and time.   Skin: Skin is warm and dry.   Psychiatric: He has a normal mood and affect.   Nursing note and vitals reviewed.       ECG 12 Lead  Date/Time: 10/16/2018 11:21 AM  Performed by: SIMON VASQUEZ " D  Authorized by: SIMON VASQUEZ   Comparison: compared with previous ECG from 2018  Similar to previous ECG  Comparison to previous ECG: AV pacing   Rhythm: paced  Rate: normal  BPM: 65  Pacin% capture                  Assessment/Plan    Heart rate and blood pressure stable. EKG done today for pacemaker and history of PAF showed AV pacing. He will be scheduled for [facility] pacer check to reassess battery life. Continue Eliquis for stroke prevention with PAF. Continue beta blocker at same dose. Thank you for providing labs. Lipids are well controlled. Continue simvastatin. He denies any cardiac symptoms, so no testing ordered today. Last stress in  showed no ischemia and last echo in 2017 showed normal LV function. He was encouraged to remain active. Limit sugars and saturated fats. Abdominal US does show small stones but no evidence of cholecystitis. No alarm symptoms. He is advised to follow up with you as well as Dr. Pickett for management of gas. Further recommendations once pacer check is done. He doesn't appear to be on aspirin secondary to Eliquis. We will see him back in six months or sooner if needed.   Collin was seen today for follow-up, pacemaker check and shortness of breath.    Diagnoses and all orders for this visit:    IHD (ischemic heart disease)    SSS (sick sinus syndrome) (CMS/HCC)    Essential hypertension    Pure hypercholesterolemia    Pacemaker    PAF (paroxysmal atrial fibrillation) (CMS/HCC)    H/O cardiac radiofrequency ablation    Other orders  -     ECG 12 Lead        Patient's Body mass index is 25.54 kg/m². BMI is above normal parameters. Recommendations include: nutrition counseling.                 Electronically signed by NANO Santana,  2018 11:22 AM

## 2018-10-24 ENCOUNTER — OFFICE VISIT (OUTPATIENT)
Dept: CARDIOLOGY | Facility: CLINIC | Age: 83
End: 2018-10-24

## 2018-10-24 ENCOUNTER — TELEPHONE (OUTPATIENT)
Dept: CARDIOLOGY | Facility: CLINIC | Age: 83
End: 2018-10-24

## 2018-10-24 DIAGNOSIS — I48.0 PAF (PAROXYSMAL ATRIAL FIBRILLATION) (HCC): ICD-10-CM

## 2018-10-24 DIAGNOSIS — I49.5 SSS (SICK SINUS SYNDROME) (HCC): Primary | ICD-10-CM

## 2018-10-24 PROCEDURE — 93288 INTERROG EVL PM/LDLS PM IP: CPT | Performed by: INTERNAL MEDICINE

## 2018-10-24 NOTE — TELEPHONE ENCOUNTER
After PPM check Dr Smith increased his Lopressor to 50 mg BID. Unable to notify pt or leave message.

## 2018-10-26 RX ORDER — METOPROLOL TARTRATE 50 MG/1
50 TABLET, FILM COATED ORAL 2 TIMES DAILY
Qty: 180 TABLET | Refills: 3 | Status: SHIPPED | OUTPATIENT
Start: 2018-10-26 | End: 2019-04-16 | Stop reason: SDUPTHER

## 2018-10-26 NOTE — TELEPHONE ENCOUNTER
Pt aware to increase Lopressor to 50 mg BID, will take 2 of the 25 mg BID until he received his 50 mg tabs from [facility].

## 2018-11-01 ENCOUNTER — TELEPHONE (OUTPATIENT)
Dept: CARDIOLOGY | Facility: CLINIC | Age: 83
End: 2018-11-01

## 2018-11-01 NOTE — TELEPHONE ENCOUNTER
Patient presented to the office stating he realized that he was already taking 2 of his Lopressor 25mg BID. Wants to know what he needs to do moving forward.     Phone number: [phone]

## 2018-11-01 NOTE — TELEPHONE ENCOUNTER
Spoke with patient, he was confused, after [facility] he had actually been taking it correctly. Aware to continue to take Lopressor 50 mg BID.

## 2018-11-15 ENCOUNTER — TELEPHONE (OUTPATIENT)
Dept: CARDIOLOGY | Facility: CLINIC | Age: 83
End: 2018-11-15

## 2018-12-12 ENCOUNTER — OFFICE VISIT (OUTPATIENT)
Dept: CARDIOLOGY | Facility: CLINIC | Age: 83
End: 2018-12-12

## 2018-12-12 DIAGNOSIS — I49.5 SSS (SICK SINUS SYNDROME) (HCC): Primary | ICD-10-CM

## 2018-12-12 DIAGNOSIS — I25.9 IHD (ISCHEMIC HEART DISEASE): ICD-10-CM

## 2018-12-12 PROCEDURE — 93288 INTERROG EVL PM/LDLS PM IP: CPT | Performed by: INTERNAL MEDICINE

## 2019-03-27 ENCOUNTER — OFFICE VISIT (OUTPATIENT)
Dept: CARDIOLOGY | Facility: CLINIC | Age: 84
End: 2019-03-27

## 2019-03-27 DIAGNOSIS — I49.5 SSS (SICK SINUS SYNDROME) (HCC): Primary | ICD-10-CM

## 2019-03-27 DIAGNOSIS — I48.0 PAF (PAROXYSMAL ATRIAL FIBRILLATION) (HCC): ICD-10-CM

## 2019-03-27 PROCEDURE — 93288 INTERROG EVL PM/LDLS PM IP: CPT | Performed by: INTERNAL MEDICINE

## 2019-03-28 ENCOUNTER — OUTSIDE FACILITY SERVICE (OUTPATIENT)
Dept: CARDIOLOGY | Facility: CLINIC | Age: 84
End: 2019-03-28

## 2019-03-28 PROCEDURE — 99222 1ST HOSP IP/OBS MODERATE 55: CPT | Performed by: INTERNAL MEDICINE

## 2019-03-28 PROCEDURE — 93306 TTE W/DOPPLER COMPLETE: CPT | Performed by: INTERNAL MEDICINE

## 2019-03-29 ENCOUNTER — OUTSIDE FACILITY SERVICE (OUTPATIENT)
Dept: CARDIOLOGY | Facility: CLINIC | Age: 84
End: 2019-03-29

## 2019-03-29 PROCEDURE — 99232 SBSQ HOSP IP/OBS MODERATE 35: CPT | Performed by: INTERNAL MEDICINE

## 2019-04-16 ENCOUNTER — TELEPHONE (OUTPATIENT)
Dept: CARDIOLOGY | Facility: CLINIC | Age: 84
End: 2019-04-16

## 2019-04-16 ENCOUNTER — OFFICE VISIT (OUTPATIENT)
Dept: CARDIOLOGY | Facility: CLINIC | Age: 84
End: 2019-04-16

## 2019-04-16 VITALS
HEART RATE: 70 BPM | DIASTOLIC BLOOD PRESSURE: 60 MMHG | SYSTOLIC BLOOD PRESSURE: 108 MMHG | BODY MASS INDEX: 24.77 KG/M2 | HEIGHT: 70 IN | WEIGHT: 173 LBS

## 2019-04-16 DIAGNOSIS — R01.1 CARDIAC MURMUR: ICD-10-CM

## 2019-04-16 DIAGNOSIS — I10 ESSENTIAL HYPERTENSION: ICD-10-CM

## 2019-04-16 DIAGNOSIS — R53.1 WEAKNESS: ICD-10-CM

## 2019-04-16 DIAGNOSIS — I48.0 PAF (PAROXYSMAL ATRIAL FIBRILLATION) (HCC): ICD-10-CM

## 2019-04-16 DIAGNOSIS — I49.5 SSS (SICK SINUS SYNDROME) (HCC): ICD-10-CM

## 2019-04-16 DIAGNOSIS — Z95.0 PACEMAKER: ICD-10-CM

## 2019-04-16 DIAGNOSIS — Z98.890 H/O CARDIAC RADIOFREQUENCY ABLATION: ICD-10-CM

## 2019-04-16 DIAGNOSIS — E78.2 MIXED HYPERLIPIDEMIA: ICD-10-CM

## 2019-04-16 DIAGNOSIS — I25.9 IHD (ISCHEMIC HEART DISEASE): Primary | ICD-10-CM

## 2019-04-16 DIAGNOSIS — Z79.01 LONG TERM (CURRENT) USE OF ANTICOAGULANTS: ICD-10-CM

## 2019-04-16 PROCEDURE — 93000 ELECTROCARDIOGRAM COMPLETE: CPT | Performed by: NURSE PRACTITIONER

## 2019-04-16 PROCEDURE — 99214 OFFICE O/P EST MOD 30 MIN: CPT | Performed by: NURSE PRACTITIONER

## 2019-04-16 RX ORDER — SIMVASTATIN 20 MG
20 TABLET ORAL NIGHTLY
Qty: 90 TABLET | Refills: 3 | Status: SHIPPED | OUTPATIENT
Start: 2019-04-16 | End: 2020-09-22

## 2019-04-16 RX ORDER — METOPROLOL TARTRATE 50 MG/1
50 TABLET, FILM COATED ORAL 2 TIMES DAILY
Qty: 180 TABLET | Refills: 3 | Status: SHIPPED | OUTPATIENT
Start: 2019-04-16 | End: 2020-08-13 | Stop reason: ALTCHOICE

## 2019-04-16 NOTE — TELEPHONE ENCOUNTER
Patient asked for samples of Eliquis 5 mg BID. We are currently out of Eliquis 5 mg. Patient was given Eliquis 2.5 mg 2 tablets BID. Patient was made aware. 28 tablets were given.

## 2019-04-16 NOTE — PROGRESS NOTES
Chief Complaint   Patient presents with   • Follow-up     For cardiac management. Patient is not on aspirin. Last lab work was done yesterday per PCP, copy in door. Reports that he has been weak for the last couple of days. Has benign pneumotosis. Reports that he had a twisted bowel and part of small intestine removed. BP yesterday 88/54. Went to the ER and had fluids, was advised at ER to not take evening dose of metoprolol last night and then only 12.5 of metoprolol this morning.   • Pacemaker Check     Last [facility] PPM check was done on 03/27/19 per our office.   • Med Refill     Needs refills on cardiac medications. 90 day supplies to [facility].        Cardiac Complaints  fatigue      Subjective   Collin Coyle is a 83 y.o. male with HTN, hyperlipidemia, PAF, SVT, and SSS for which he underwent ablation by Dr. Canales in 2004. He later underwent pacemaker placement by Dr. Sims in 2011. For IHD, he underwent stent placement in 2004. His stress test in 2015 showed no ischemia. Most recent echo in 2017 showed good LV function and only mild MR. Pacer check in November of 2017 showed 5 episodes of AMS and Eliquis was added. Most recent [facility] pacer check in March of 2019 showed 88% atrial pacing, 100% ventricular pacing, 1% AMS, and less than 6 months of battery life remaining.  Patient remains on every 3 month pacer checks.  Patient returns today for follow up and reports generalized weakness.  Patient reports he did have a twisted bowel which was corrected but also had a segment of the small intestine removed.  According to patient, there were two areas of the portion of the small intestine which were suspicious and were sent for biopsy.  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  He is following with Dr. Monet in regards and does follow with him on Thursday to discuss findings, no report available for review.  Blood pressure was low yesterday and has been on and off since  surgery. Patient reports holding his beta blocker as was recommended last night and only taking 12.5mg this AM.  He does admit he was dehydrated and did get a liter of fluid in the ER. Patient states he is slowly regaining an appetite and trying to drink once again, he has been trying to drink more water and is supplementing diet with boost. Chest pain, shortness of breath, palpitations, and dizziness are denied.  No sign of active bleed noted according to patient.  Labs from yesterday show:  Glucose 112, BUN 23, creatinine 1.44, K 4.1, Na 141, GFR 49.9, Albumin 2.5, AST 74, ALT 50, H/H 12.8/38.9, and platelet 249.  Refills of cardiac meds requested for 90 day along with samples of eliquis requested.          Cardiac History  Past Surgical History:   Procedure Laterality Date   • CARDIAC ABLATION  2004    Dr. Canales   • CARDIAC CATHETERIZATION  06/29/2004    70% LAD. RTA & 2.5x20, 3.0x20 taxus in LAD   • CARDIOVASCULAR STRESS TEST  04/11/2007    7 min, 93% THR. Negative   • CARDIOVASCULAR STRESS TEST  07/14/2009    7 min, 89% THR. Negative   • CARDIOVASCULAR STRESS TEST  09/12/2012    6 min, 97% THR, 140/72, small inferior infarct   • CARDIOVASCULAR STRESS TEST  10/19/2015    EF 65%, no ischemic burden   • CONVERTED (HISTORICAL) HOLTER  07/11/2003    NSR-72 bpm, one short run of SVT noted   • CONVERTED (HISTORICAL) HOLTER  03/03/2011    AVG HR 59 bpm. 213 pauses   • ECHO - CONVERTED  10/06/2008    EF >60%, AO root- 4.0cm   • ECHO - CONVERTED  07/14/2009    EF >60%, PA pressure 40.29 mmHg, AO root 4.3 cm   • ECHO - CONVERTED  06/08/2011    EF 50-55%, mild AR and MR, RVSP 40.47 mmHg   • ECHO - CONVERTED  10/19/2015    EF 50-55%, mild MR, mild AR, AO 3.8 cm2   • ECHO - CONVERTED  04/17/2017    EF 50-55%, mild MR, RVSP 26 mmHg   • PACEMAKER IMPLANTATION  03/17/2011    [email or web address]. Dr. Sims.    • US CAROTID UNILATERAL  04/05/2013    no hemodynamically sig stenosis       Current Outpatient Medications    Medication Sig Dispense Refill   • metoprolol tartrate (LOPRESSOR) 50 MG tablet Take 1 tablet by mouth 2 (Two) Times a Day. 180 tablet 3   • Multiple Vitamin (MULTI VITAMIN PO) Take  by mouth Daily.     • simvastatin (ZOCOR) 20 MG tablet Take 1 tablet by mouth Every Night. 90 tablet 3   • apixaban (ELIQUIS) 5 MG tablet tablet Take 1 tablet by mouth 2 (Two) Times a Day. 28 tablet 0     No current facility-administered medications for this visit.        Patient has no known allergies.    Past Medical History:   Diagnosis Date   • Cholelithiasis    • H/O cataract extraction     bilateral   • H/O hernia repair    • Hx of appendectomy    • Hypercholesterolemia    • Hyperlipidemia    • Hypertension    • IHD (ischemic heart disease)        Social History     Socioeconomic History   • Marital status:      Spouse name: Not on file   • Number of children: Not on file   • Years of education: Not on file   • Highest education level: Not on file   Tobacco Use   • Smoking status: Former Smoker     Last attempt to quit: 1963     Years since quittin.3   • Smokeless tobacco: Never Used   Substance and Sexual Activity   • Alcohol use: No   • Drug use: No       Family History   Problem Relation Age of Onset   • Heart disease Mother    • Hyperlipidemia Mother    • Hypertension Mother    • Cancer Father    • Heart disease Sister        Review of Systems   Constitution: Positive for weakness. Negative for malaise/fatigue.   Cardiovascular: Negative for chest pain, claudication, dyspnea on exertion, irregular heartbeat, leg swelling, near-syncope, orthopnea, palpitations and syncope.   Respiratory: Negative for shortness of breath and wheezing.    Musculoskeletal: Negative for back pain, joint pain and joint swelling.   Gastrointestinal: Positive for flatus. Negative for anorexia, heartburn, nausea and vomiting.   Genitourinary: Negative for dysuria, hematuria, hesitancy and nocturia.   Neurological: Negative for dizziness,  "light-headedness and loss of balance.   Psychiatric/Behavioral: Negative for depression and memory loss. The patient is not nervous/anxious.            Objective     /60 (BP Location: Left arm)   Pulse 70   Ht 177.8 cm (70\")   Wt 78.5 kg (173 lb)   BMI 24.82 kg/m²     Physical Exam   Constitutional: He is oriented to person, place, and time. He appears well-developed and well-nourished.   HENT:   Head: Normocephalic and atraumatic.   Eyes: EOM are normal. Pupils are equal, round, and reactive to light.   Neck: Normal range of motion. Neck supple.   Cardiovascular: Normal rate and regular rhythm.   Murmur heard.  Pulmonary/Chest: Effort normal and breath sounds normal.   Abdominal: Soft.   Musculoskeletal: Normal range of motion.   Neurological: He is alert and oriented to person, place, and time.   Skin: Skin is warm and dry.   Psychiatric: He has a normal mood and affect. His behavior is normal.         ECG 12 Lead  Date/Time: 4/16/2019 10:27 AM  Performed by: Corrine Covarrubias APRN  Authorized by: Corrine Covarrubias APRN   Rhythm: paced  BPM: 70              Assessment/Plan     HR is stable today and regular.  EKG done today for PAF and SSS shows a ventricular paced rhythm and underlying NSR with normal QT.  Patient remains rate controlled with metoprolol for PAF and anticoagulated with eliquis therapy.  Samples of eliquis provided to patient as bleeding and bruising are denied, with only mild anemia noted on most recent labs.  [facility] pacer check has been scheduled for June as pacer battery is close to WILLIS and is now on every 3 month check. Metoprolol therapy to be hold for now as patient is very weak and unstable on his feet, with multiple hypotensive readings noted at home.  No issues with HTN at present.  Once his fluid intake improves and blood pressure begins to return to normal as well as weakness subsiding, patient was urged to begin at half original dose at 25mg daily and then if blood pressure " tolerates, 25mg BID.  Patient, wife, and daughter were all encouraged to monitor pulse rate as well and resume beta blocker therapy with any tachyarrhythmia.  He will continue on current statin regimen for hyperlipidemia management as no recent FLP available for review to assess efficacy.  He reports you as following.  Can we get next FLP for our records?  Refills sent per request.  He will be following with Dr. Monet for suspicious lesions of small intestine that were biopsied and benign pneumatosis intestialis, which he will follow with him on Thursday of this week.  BMI stable at 24.82 but weight is down another 5 pounds from prior.  Patient encouraged on good fluid intake to avoid hypotension as well as supplementation of diet with protein supplement to ingest until appetite returns.  6 month follow up recommended or sooner if needed.      Problems Addressed this Visit        Cardiovascular and Mediastinum    IHD (ischemic heart disease) - Primary    Relevant Medications    metoprolol tartrate (LOPRESSOR) 50 MG tablet    SSS (sick sinus syndrome) (CMS/HCC)    Relevant Medications    metoprolol tartrate (LOPRESSOR) 50 MG tablet    Other Relevant Orders    ECG 12 Lead    HTN (hypertension)    Relevant Medications    metoprolol tartrate (LOPRESSOR) 50 MG tablet    Hyperlipemia    Relevant Medications    simvastatin (ZOCOR) 20 MG tablet    Pacemaker    Cardiac murmur    PAF (paroxysmal atrial fibrillation) (CMS/HCC)    Relevant Medications    metoprolol tartrate (LOPRESSOR) 50 MG tablet    Other Relevant Orders    ECG 12 Lead       Other    H/O cardiac radiofrequency ablation      Other Visit Diagnoses     Long term (current) use of anticoagulants        Weakness              Patient's Body mass index is 24.82 kg/m². BMI is within normal parameters. No follow-up required..        Electronically signed by NANO Pitt April 17, 2019 7:13 PM

## 2019-05-01 ENCOUNTER — TELEPHONE (OUTPATIENT)
Dept: CARDIOLOGY | Facility: CLINIC | Age: 84
End: 2019-05-01

## 2019-06-10 ENCOUNTER — TELEPHONE (OUTPATIENT)
Dept: CARDIOLOGY | Facility: CLINIC | Age: 84
End: 2019-06-10

## 2019-06-12 ENCOUNTER — OFFICE VISIT (OUTPATIENT)
Dept: CARDIOLOGY | Facility: CLINIC | Age: 84
End: 2019-06-12

## 2019-06-12 DIAGNOSIS — I49.5 SSS (SICK SINUS SYNDROME) (HCC): ICD-10-CM

## 2019-06-12 DIAGNOSIS — I25.9 IHD (ISCHEMIC HEART DISEASE): Primary | ICD-10-CM

## 2019-06-12 DIAGNOSIS — I48.0 PAF (PAROXYSMAL ATRIAL FIBRILLATION) (HCC): ICD-10-CM

## 2019-06-12 PROCEDURE — 93283 PRGRMG EVAL IMPLANTABLE DFB: CPT | Performed by: INTERNAL MEDICINE

## 2019-09-09 ENCOUNTER — TELEPHONE (OUTPATIENT)
Dept: CARDIOLOGY | Facility: CLINIC | Age: 84
End: 2019-09-09

## 2019-09-09 NOTE — TELEPHONE ENCOUNTER
Patients wife Amee called requesting samples of Eliquis 5 mg #14 given.    LOT # [id number]  EXP.  11/30/2021

## 2019-09-11 ENCOUNTER — TELEPHONE (OUTPATIENT)
Dept: CARDIOLOGY | Facility: CLINIC | Age: 84
End: 2019-09-11

## 2019-09-11 ENCOUNTER — OFFICE VISIT (OUTPATIENT)
Dept: CARDIOLOGY | Facility: CLINIC | Age: 84
End: 2019-09-11

## 2019-09-11 DIAGNOSIS — I49.5 SSS (SICK SINUS SYNDROME) (HCC): Primary | ICD-10-CM

## 2019-09-11 DIAGNOSIS — I48.0 PAF (PAROXYSMAL ATRIAL FIBRILLATION) (HCC): ICD-10-CM

## 2019-09-11 PROCEDURE — 93288 INTERROG EVL PM/LDLS PM IP: CPT | Performed by: INTERNAL MEDICINE

## 2019-09-11 NOTE — TELEPHONE ENCOUNTER
Patient aware of [employer] PPM interrogation results.  ([facility]ILL[facility])  Patient does wish to have battery change out locally here in [place].  I spoke with Octavia at Dr. Cruz's office.  She requests to fax all information to [phone] and Dr. Cruz's office will call patient to arrange procedure.    Records faxed to Dr. Cruz's office.  Patient is aware Dr. Cruz's office will contact him to schedule procedure and give him his instructions.

## 2019-09-16 ENCOUNTER — TELEPHONE (OUTPATIENT)
Dept: CARDIOLOGY | Facility: CLINIC | Age: 84
End: 2019-09-16

## 2019-09-16 NOTE — TELEPHONE ENCOUNTER
Letter sent stating for patient to hold Eliquis 3 to 4 days prior to surgery for PPM generator change.

## 2019-09-16 NOTE — TELEPHONE ENCOUNTER
Gracy from Dr. Cruz's office called asking for how long to hold patient's Eliquis prior to PPM generator change?      Fax [phone]

## 2019-10-14 ENCOUNTER — TELEPHONE (OUTPATIENT)
Dept: CARDIOLOGY | Facility: CLINIC | Age: 84
End: 2019-10-14

## 2019-10-31 ENCOUNTER — OFFICE VISIT (OUTPATIENT)
Dept: CARDIOLOGY | Facility: CLINIC | Age: 84
End: 2019-10-31

## 2019-10-31 VITALS
HEART RATE: 65 BPM | WEIGHT: 171 LBS | DIASTOLIC BLOOD PRESSURE: 64 MMHG | HEIGHT: 70 IN | SYSTOLIC BLOOD PRESSURE: 130 MMHG | BODY MASS INDEX: 24.48 KG/M2

## 2019-10-31 DIAGNOSIS — E78.2 MIXED HYPERLIPIDEMIA: ICD-10-CM

## 2019-10-31 DIAGNOSIS — I25.9 IHD (ISCHEMIC HEART DISEASE): Primary | ICD-10-CM

## 2019-10-31 DIAGNOSIS — I49.5 SSS (SICK SINUS SYNDROME) (HCC): ICD-10-CM

## 2019-10-31 DIAGNOSIS — Z95.0 PACEMAKER: ICD-10-CM

## 2019-10-31 DIAGNOSIS — Z78.9 PACED RHYTHM ON ELECTROCARDIOGRAM (ECG): ICD-10-CM

## 2019-10-31 DIAGNOSIS — R01.1 CARDIAC MURMUR: ICD-10-CM

## 2019-10-31 DIAGNOSIS — I48.0 PAF (PAROXYSMAL ATRIAL FIBRILLATION) (HCC): ICD-10-CM

## 2019-10-31 DIAGNOSIS — I10 ESSENTIAL HYPERTENSION: ICD-10-CM

## 2019-10-31 DIAGNOSIS — Z79.01 CURRENT USE OF LONG TERM ANTICOAGULATION: ICD-10-CM

## 2019-10-31 PROCEDURE — 93000 ELECTROCARDIOGRAM COMPLETE: CPT | Performed by: NURSE PRACTITIONER

## 2019-10-31 PROCEDURE — 99214 OFFICE O/P EST MOD 30 MIN: CPT | Performed by: NURSE PRACTITIONER

## 2019-12-11 ENCOUNTER — OFFICE VISIT (OUTPATIENT)
Dept: CARDIOLOGY | Facility: CLINIC | Age: 84
End: 2019-12-11

## 2019-12-11 DIAGNOSIS — I49.5 SSS (SICK SINUS SYNDROME) (HCC): Primary | ICD-10-CM

## 2019-12-11 DIAGNOSIS — I48.0 PAF (PAROXYSMAL ATRIAL FIBRILLATION) (HCC): ICD-10-CM

## 2019-12-11 PROCEDURE — 93280 PM DEVICE PROGR EVAL DUAL: CPT | Performed by: INTERNAL MEDICINE

## 2020-04-23 ENCOUNTER — TELEPHONE (OUTPATIENT)
Dept: CARDIOLOGY | Facility: CLINIC | Age: 85
End: 2020-04-23

## 2020-08-13 ENCOUNTER — OFFICE VISIT (OUTPATIENT)
Dept: CARDIOLOGY | Facility: CLINIC | Age: 85
End: 2020-08-13

## 2020-08-13 VITALS
WEIGHT: 178.6 LBS | BODY MASS INDEX: 25.57 KG/M2 | HEIGHT: 70 IN | DIASTOLIC BLOOD PRESSURE: 72 MMHG | TEMPERATURE: 97.5 F | SYSTOLIC BLOOD PRESSURE: 140 MMHG | HEART RATE: 74 BPM

## 2020-08-13 DIAGNOSIS — E78.2 MIXED HYPERLIPIDEMIA: ICD-10-CM

## 2020-08-13 DIAGNOSIS — I49.5 SSS (SICK SINUS SYNDROME) (HCC): ICD-10-CM

## 2020-08-13 DIAGNOSIS — R06.02 SHORTNESS OF BREATH: ICD-10-CM

## 2020-08-13 DIAGNOSIS — I10 ESSENTIAL HYPERTENSION: Primary | ICD-10-CM

## 2020-08-13 DIAGNOSIS — I25.9 IHD (ISCHEMIC HEART DISEASE): ICD-10-CM

## 2020-08-13 DIAGNOSIS — I48.0 PAF (PAROXYSMAL ATRIAL FIBRILLATION) (HCC): ICD-10-CM

## 2020-08-13 PROCEDURE — 99214 OFFICE O/P EST MOD 30 MIN: CPT | Performed by: NURSE PRACTITIONER

## 2020-08-13 NOTE — PROGRESS NOTES
Chief Complaint   Patient presents with   • Follow-up     Cardiac management.   • Pacemaker Check     Last [employer] PPM interrogation 12/11/19.   • Lab     Has copy of most recent labs.   • Medication     PCP changed Metoprolol dosing, patient is checking B/P twice daily.   • Med Refill     Needs refills on Simvastatin-90 day.     Geena Coyle is an 85 y.o. male with HTN, hyperlipidemia, PAF, SVT, and SSS for which he underwent ablation by Dr. Canales in 2004. He later underwent pacemaker placement by Dr. Sims in 2011. For IHD, he underwent stent placement in 2004. His stress test in 2015 showed no ischemia. In November 2017 he was noted to have 5 episodes of AMS. Eliquis added. He was diagnosed with benign pneumatosis of the bowel wall, hospitalized and underwent emergency gallbladder surgery per Dr. Monet in June 2019. He was given Kcentra, reversal agent for Eliquis.      On 9/11/2019 [employer] device interrogation showed 59% atrial and 97% ventricular pacing.  220 mode switches were noted with a max of 3.2 days.  Generator had reached WILLIS. On 9/27/2019 he underwent generator change out by Dr. Cruz without problem.     He returns today for follow up with his wife. He denies any new cardiac symptoms. No significant chest discomfort but does admit to mild dyspnea on exertion. Appetite continues to improve and he has gained about 7 pounds. No recent hospitalizations. He takes metoprolol as needed, usually 25 mg in the morning with pm dose as needed. He occasionally holds am dose if bp normal. Labs brought in today showed , Tri 93, LDL 54, HDL 42, CBC normal, alb 3.4, alk phos 133.          Cardiac History:    Past Surgical History:   Procedure Laterality Date   • CARDIAC ABLATION  2004    Dr. Canales   • CARDIAC CATHETERIZATION  06/29/2004    70% LAD. RTA & 2.5x20, 3.0x20 taxus in LAD   • CARDIOVASCULAR STRESS TEST  04/11/2007    7 min, 93% THR. Negative   •  CARDIOVASCULAR STRESS TEST  07/14/2009    7 min, 89% THR. Negative   • CARDIOVASCULAR STRESS TEST  09/12/2012    6 min, 97% THR, 140/72, small inferior infarct   • CARDIOVASCULAR STRESS TEST  10/19/2015    EF 65%, no ischemic burden   • CONVERTED (HISTORICAL) HOLTER  07/11/2003    NSR-72 bpm, one short run of SVT noted   • CONVERTED (HISTORICAL) HOLTER  03/03/2011    AVG HR 59 bpm. 213 pauses   • ECHO - CONVERTED  10/06/2008    EF >60%, AO root- 4.0cm   • ECHO - CONVERTED  07/14/2009    EF >60%, PA pressure 40.29 mmHg, AO root 4.3 cm   • ECHO - CONVERTED  06/08/2011    EF 50-55%, mild AR and MR, RVSP 40.47 mmHg   • ECHO - CONVERTED  10/19/2015    EF 50-55%, mild MR, mild AR, AO 3.8 cm2   • ECHO - CONVERTED  04/17/2017    EF 50-55%, mild MR, RVSP 26 mmHg   • ECHO - CONVERTED  03/28/2019    [facility]- TLS- EF 55%, diastolic dysfunction, mild MR, PA pressure 37 mmHg   • PACEMAKER IMPLANTATION  03/17/2011    [employer]. Dr. Sims.    • US CAROTID UNILATERAL  04/05/2013    no hemodynamically sig stenosis       Current Outpatient Medications   Medication Sig Dispense Refill   • metoprolol tartrate (LOPRESSOR) 25 MG tablet Take 25 mg by mouth 2 (Two) Times a Day As Needed.     • Multiple Vitamin (MULTI VITAMIN PO) Take  by mouth Daily.     • simvastatin (ZOCOR) 20 MG tablet Take 1 tablet by mouth Every Night. 90 tablet 3   • URSODIOL PO Take 500 mg by mouth 2 (Two) Times a Day. Takes 2 tablet BID      • apixaban (Eliquis) 5 MG tablet tablet Take 1 tablet by mouth 2 (Two) Times a Day. 14 tablet 0     No current facility-administered medications for this visit.        Patient has no known allergies.    Past Medical History:   Diagnosis Date   • Cholelithiasis    • H/O cataract extraction     bilateral   • H/O hernia repair    • Hx of appendectomy    • Hypercholesterolemia    • Hyperlipidemia    • Hypertension    • IHD (ischemic heart disease)        Social History     Socioeconomic History   • Marital status:       "Spouse name: Not on file   • Number of children: Not on file   • Years of education: Not on file   • Highest education level: Not on file   Tobacco Use   • Smoking status: Former Smoker     Last attempt to quit: 1963     Years since quittin.6   • Smokeless tobacco: Never Used   Substance and Sexual Activity   • Alcohol use: No   • Drug use: No       Family History   Problem Relation Age of Onset   • Heart disease Mother    • Hyperlipidemia Mother    • Hypertension Mother    • Cancer Father    • Heart disease Sister        Review of Systems   Constitution: Positive for weight gain (up 7 lb). Negative for decreased appetite and malaise/fatigue.   HENT: Negative.    Eyes: Negative for blurred vision.   Cardiovascular: Positive for dyspnea on exertion. Negative for chest pain, leg swelling, palpitations and syncope.   Respiratory: Negative for shortness of breath and sleep disturbances due to breathing.    Endocrine: Negative.    Hematologic/Lymphatic: Negative for bleeding problem. Does not bruise/bleed easily.   Skin: Negative.    Musculoskeletal: Negative for falls and myalgias.   Gastrointestinal: Negative for abdominal pain, heartburn and melena.   Genitourinary: Negative for hematuria.   Neurological: Negative for dizziness and light-headedness.   Psychiatric/Behavioral: Negative for altered mental status.   Allergic/Immunologic: Negative.       Objective     /72 (BP Location: Left arm)   Pulse 74   Temp 97.5 °F (36.4 °C)   Ht 177.8 cm (70\")   Wt 81 kg (178 lb 9.6 oz)   BMI 25.63 kg/m²     Physical Exam   Constitutional: He is oriented to person, place, and time. He appears well-developed and well-nourished. No distress.   HENT:   Head: Normocephalic.   Eyes: Pupils are equal, round, and reactive to light.   Neck: Normal range of motion.   Cardiovascular: Normal rate, regular rhythm, S1 normal, S2 normal and intact distal pulses.   Murmur heard.  Pulmonary/Chest: Effort normal and breath sounds " normal. No respiratory distress.   Abdominal: Soft. Bowel sounds are normal.   Musculoskeletal: Normal range of motion. He exhibits no edema.   Neurological: He is alert and oriented to person, place, and time.   Skin: Skin is warm and dry. He is not diaphoretic.   Psychiatric: He has a normal mood and affect.   Nursing note and vitals reviewed.       ECG 12 Lead  Date/Time: 8/14/2020 12:06 PM  Performed by: Nidhi Escamilla APRN  Authorized by: Nidhi Escamilla APRN   Comparison: compared with previous ECG from 10/31/2019  Similar to previous ECG  Rhythm: sinus rhythm and paced  Rate: normal  BPM: 74  ST Segments: ST segments normal  Pacing: ventricular paced rhythm  Clinical impression: abnormal EKG  Comments: Intermittent atrial pacing   V paced                 Problem List Items Addressed This Visit        Cardiovascular and Mediastinum    IHD (ischemic heart disease)    Relevant Medications    metoprolol tartrate (LOPRESSOR) 25 MG tablet    Other Relevant Orders    Stress Test With Myocardial Perfusion One Day    Adult Transthoracic Echo Complete W/ Cont if Necessary Per Protocol    SSS (sick sinus syndrome) (CMS/HCC)    Relevant Medications    metoprolol tartrate (LOPRESSOR) 25 MG tablet    Other Relevant Orders    Stress Test With Myocardial Perfusion One Day    Adult Transthoracic Echo Complete W/ Cont if Necessary Per Protocol    ECG 12 Lead    HTN (hypertension) - Primary    Relevant Medications    metoprolol tartrate (LOPRESSOR) 25 MG tablet    Other Relevant Orders    Stress Test With Myocardial Perfusion One Day    Adult Transthoracic Echo Complete W/ Cont if Necessary Per Protocol    Hyperlipemia    Relevant Orders    Stress Test With Myocardial Perfusion One Day    Adult Transthoracic Echo Complete W/ Cont if Necessary Per Protocol    PAF (paroxysmal atrial fibrillation) (CMS/[facility])    Relevant Medications    metoprolol tartrate (LOPRESSOR) 25 MG tablet    Other Relevant Orders    Stress Test With Myocardial  Perfusion One Day    Adult Transthoracic Echo Complete W/ Cont if Necessary Per Protocol    ECG 12 Lead      Other Visit Diagnoses     Shortness of breath        Relevant Orders    Stress Test With Myocardial Perfusion One Day    Adult Transthoracic Echo Complete W/ Cont if Necessary Per Protocol         1. HTN- well controlled. Continue current dose of Lopressor. If BP <120/70, can hold dose. Limit Na.     2. Hyperlipidemia- very well controlled with simvastatin. Continue the same. Refills sent.     3. IHD- s/p stenting of the LAD. His last nuclear stress in 2015 remained stable. Due to hx of CAD, stenting, length of time and dyspnea on exertion, recommend repeating his stress test to evaluate anterior and septal wall perfusion, EF. Echocardiogram to assess MR and PA pressure.     4. PAF/SSS- EKG showed intermittent atrial pacing and V pacing. Will schedule [facility] pacer check. Continue metoprolol and Eliquis for CVA prophylaxis.     5. SOB- stress test and echocardiogram to evaluate dyspnea, PA pressure as noted to be elevated on last echo.     No changes made today. Further recommendations to follow echo, stress, and pacer check. Thank you for sending labs. Follow up in 6 months.     Patient's Body mass index is 25.63 kg/m². BMI is above normal parameters. Recommendations include: nutrition counseling.                           Electronically signed by NANO Santana,  August 17, 2020 07:09

## 2020-08-14 PROCEDURE — 93000 ELECTROCARDIOGRAM COMPLETE: CPT | Performed by: NURSE PRACTITIONER

## 2020-08-19 ENCOUNTER — APPOINTMENT (OUTPATIENT)
Dept: CARDIOLOGY | Facility: HOSPITAL | Age: 85
End: 2020-08-19

## 2020-08-26 ENCOUNTER — HOSPITAL ENCOUNTER (OUTPATIENT)
Dept: CARDIOLOGY | Facility: HOSPITAL | Age: 85
Discharge: HOME | End: 2020-08-26

## 2020-08-26 DIAGNOSIS — R06.02 SHORTNESS OF BREATH: ICD-10-CM

## 2020-08-26 DIAGNOSIS — I48.0 PAF (PAROXYSMAL ATRIAL FIBRILLATION) (HCC): ICD-10-CM

## 2020-08-26 DIAGNOSIS — I49.5 SSS (SICK SINUS SYNDROME) (HCC): ICD-10-CM

## 2020-08-26 DIAGNOSIS — I25.9 IHD (ISCHEMIC HEART DISEASE): ICD-10-CM

## 2020-08-26 DIAGNOSIS — I10 ESSENTIAL HYPERTENSION: ICD-10-CM

## 2020-08-26 DIAGNOSIS — E78.2 MIXED HYPERLIPIDEMIA: ICD-10-CM

## 2020-08-26 LAB
BH CV ECHO MEAS - ACS: 1.2 CM
BH CV ECHO MEAS - AO MAX PG (FULL): 12.3 MMHG
BH CV ECHO MEAS - AO MAX PG: 14.5 MMHG
BH CV ECHO MEAS - AO MEAN PG (FULL): 8 MMHG
BH CV ECHO MEAS - AO MEAN PG: 10 MMHG
BH CV ECHO MEAS - AO ROOT AREA (BSA CORRECTED): 1.9
BH CV ECHO MEAS - AO ROOT AREA: 11.3 CM^2
BH CV ECHO MEAS - AO ROOT DIAM: 3.8 CM
BH CV ECHO MEAS - AO V2 MAX: 190.3 CM/SEC
BH CV ECHO MEAS - AO V2 MEAN: 149 CM/SEC
BH CV ECHO MEAS - AO V2 VTI: 44 CM
BH CV ECHO MEAS - ASC AORTA: 3.7 CM
BH CV ECHO MEAS - AVA(I,A): 1.8 CM^2
BH CV ECHO MEAS - AVA(I,D): 1.8 CM^2
BH CV ECHO MEAS - AVA(V,A): 1.6 CM^2
BH CV ECHO MEAS - AVA(V,D): 1.6 CM^2
BH CV ECHO MEAS - BSA(HAYCOCK): 2 M^2
BH CV ECHO MEAS - BSA: 2 M^2
BH CV ECHO MEAS - BZI_BMI: 25.5 KILOGRAMS/M^2
BH CV ECHO MEAS - BZI_METRIC_HEIGHT: 177.8 CM
BH CV ECHO MEAS - BZI_METRIC_WEIGHT: 80.7 KG
BH CV ECHO MEAS - EDV(CUBED): 47 ML
BH CV ECHO MEAS - EDV(MOD-SP4): 130 ML
BH CV ECHO MEAS - EDV(TEICH): 54.8 ML
BH CV ECHO MEAS - EF(CUBED): 60 %
BH CV ECHO MEAS - EF(MOD-SP4): 54.4 %
BH CV ECHO MEAS - EF(TEICH): 52.5 %
BH CV ECHO MEAS - ESV(CUBED): 18.8 ML
BH CV ECHO MEAS - ESV(MOD-SP4): 59.3 ML
BH CV ECHO MEAS - ESV(TEICH): 26 ML
BH CV ECHO MEAS - FS: 26.3 %
BH CV ECHO MEAS - IVS/LVPW: 0.98
BH CV ECHO MEAS - IVSD: 1.2 CM
BH CV ECHO MEAS - LA DIMENSION: 3.8 CM
BH CV ECHO MEAS - LA/AO: 0.79
BH CV ECHO MEAS - LV DIASTOLIC VOL/BSA (35-75): 65.4 ML/M^2
BH CV ECHO MEAS - LV IVRT: 0.15 SEC
BH CV ECHO MEAS - LV MASS(C)D: 143.9 GRAMS
BH CV ECHO MEAS - LV MASS(C)DI: 72.4 GRAMS/M^2
BH CV ECHO MEAS - LV MAX PG: 2.2 MMHG
BH CV ECHO MEAS - LV MEAN PG: 2 MMHG
BH CV ECHO MEAS - LV SYSTOLIC VOL/BSA (12-30): 29.9 ML/M^2
BH CV ECHO MEAS - LV V1 MAX: 74.4 CM/SEC
BH CV ECHO MEAS - LV V1 MEAN: 60.5 CM/SEC
BH CV ECHO MEAS - LV V1 VTI: 19.4 CM
BH CV ECHO MEAS - LVIDD: 3.6 CM
BH CV ECHO MEAS - LVIDS: 2.7 CM
BH CV ECHO MEAS - LVLD AP4: 9.4 CM
BH CV ECHO MEAS - LVLS AP4: 8.8 CM
BH CV ECHO MEAS - LVOT AREA (M): 4.2 CM^2
BH CV ECHO MEAS - LVOT AREA: 4.2 CM^2
BH CV ECHO MEAS - LVOT DIAM: 2.3 CM
BH CV ECHO MEAS - LVPWD: 1.2 CM
BH CV ECHO MEAS - MV A MAX VEL: 92.1 CM/SEC
BH CV ECHO MEAS - MV DEC SLOPE: 291 CM/SEC^2
BH CV ECHO MEAS - MV E MAX VEL: 67.7 CM/SEC
BH CV ECHO MEAS - MV E/A: 0.74
BH CV ECHO MEAS - RAP SYSTOLE: 10 MMHG
BH CV ECHO MEAS - RVDD: 2.9 CM
BH CV ECHO MEAS - RVSP: 22.8 MMHG
BH CV ECHO MEAS - SI(AO): 251.2 ML/M^2
BH CV ECHO MEAS - SI(CUBED): 14.2 ML/M^2
BH CV ECHO MEAS - SI(LVOT): 40.6 ML/M^2
BH CV ECHO MEAS - SI(MOD-SP4): 35.6 ML/M^2
BH CV ECHO MEAS - SI(TEICH): 14.5 ML/M^2
BH CV ECHO MEAS - SV(AO): 499 ML
BH CV ECHO MEAS - SV(CUBED): 28.2 ML
BH CV ECHO MEAS - SV(LVOT): 80.6 ML
BH CV ECHO MEAS - SV(MOD-SP4): 70.7 ML
BH CV ECHO MEAS - SV(TEICH): 28.8 ML
BH CV ECHO MEAS - TR MAX VEL: 179 CM/SEC
BH CV STRESS COMMENTS STAGE 1: NORMAL
BH CV STRESS DOSE REGADENOSON STAGE 1: 0.4
BH CV STRESS DURATION MIN STAGE 1: 0
BH CV STRESS DURATION SEC STAGE 1: 10
BH CV STRESS PROTOCOL 1: NORMAL
BH CV STRESS RECOVERY BP: NORMAL MMHG
BH CV STRESS RECOVERY HR: 67 BPM
BH CV STRESS STAGE 1: 1
LV EF NUC BP: 59 %
MAXIMAL PREDICTED HEART RATE: 136 BPM
MAXIMAL PREDICTED HEART RATE: 136 BPM
PERCENT MAX PREDICTED HR: 50.74 %
STRESS BASELINE BP: NORMAL MMHG
STRESS BASELINE HR: 68 BPM
STRESS PERCENT HR: 60 %
STRESS POST PEAK BP: NORMAL MMHG
STRESS POST PEAK HR: 69 BPM
STRESS TARGET HR: 116 BPM
STRESS TARGET HR: 116 BPM

## 2020-08-26 PROCEDURE — 93017 CV STRESS TEST TRACING ONLY: CPT

## 2020-08-26 PROCEDURE — 25010000002 REGADENOSON 0.4 MG/5ML SOLUTION: Performed by: INTERNAL MEDICINE

## 2020-08-26 PROCEDURE — A9500 TC99M SESTAMIBI: HCPCS | Performed by: INTERNAL MEDICINE

## 2020-08-26 PROCEDURE — 93306 TTE W/DOPPLER COMPLETE: CPT | Performed by: INTERNAL MEDICINE

## 2020-08-26 PROCEDURE — 78452 HT MUSCLE IMAGE SPECT MULT: CPT | Performed by: INTERNAL MEDICINE

## 2020-08-26 PROCEDURE — 93016 CV STRESS TEST SUPVJ ONLY: CPT | Performed by: NURSE PRACTITIONER

## 2020-08-26 PROCEDURE — 0 TECHNETIUM SESTAMIBI: Performed by: INTERNAL MEDICINE

## 2020-08-26 PROCEDURE — 78452 HT MUSCLE IMAGE SPECT MULT: CPT

## 2020-08-26 PROCEDURE — 93018 CV STRESS TEST I&R ONLY: CPT | Performed by: INTERNAL MEDICINE

## 2020-08-26 PROCEDURE — 93306 TTE W/DOPPLER COMPLETE: CPT

## 2020-08-26 RX ADMIN — TECHNETIUM TC 99M SESTAMIBI 1 DOSE: 1 INJECTION INTRAVENOUS at 08:39

## 2020-08-26 RX ADMIN — TECHNETIUM TC 99M SESTAMIBI 1 DOSE: 1 INJECTION INTRAVENOUS at 08:41

## 2020-08-26 RX ADMIN — REGADENOSON 0.4 MG: 0.08 INJECTION, SOLUTION INTRAVENOUS at 08:41

## 2020-09-09 ENCOUNTER — OFFICE VISIT (OUTPATIENT)
Dept: CARDIOLOGY | Facility: CLINIC | Age: 85
End: 2020-09-09

## 2020-09-09 ENCOUNTER — TELEPHONE (OUTPATIENT)
Dept: CARDIOLOGY | Facility: CLINIC | Age: 85
End: 2020-09-09

## 2020-09-09 DIAGNOSIS — I48.0 PAF (PAROXYSMAL ATRIAL FIBRILLATION) (HCC): ICD-10-CM

## 2020-09-09 DIAGNOSIS — I49.5 SSS (SICK SINUS SYNDROME) (HCC): Primary | ICD-10-CM

## 2020-09-09 DIAGNOSIS — I25.9 IHD (ISCHEMIC HEART DISEASE): ICD-10-CM

## 2020-09-09 PROCEDURE — 93288 INTERROG EVL PM/LDLS PM IP: CPT | Performed by: INTERNAL MEDICINE

## 2020-09-10 ENCOUNTER — TELEPHONE (OUTPATIENT)
Dept: CARDIOLOGY | Facility: CLINIC | Age: 85
End: 2020-09-10

## 2020-09-10 NOTE — TELEPHONE ENCOUNTER
After reviewing PPM check your recommendation was  to increase Lopressor to 50 mg BID. After discussing with wife pt only takes the 25 mg BID prn and rarely takes. Reports BP running 128/60's. I advised her to have him take the 25 mg BID every day instead of prn.

## 2020-09-22 ENCOUNTER — TELEPHONE (OUTPATIENT)
Dept: CARDIOLOGY | Facility: CLINIC | Age: 85
End: 2020-09-22

## 2020-09-22 RX ORDER — SIMVASTATIN 20 MG
TABLET ORAL
Qty: 90 TABLET | Refills: 3 | Status: SHIPPED | OUTPATIENT
Start: 2020-09-22 | End: 2021-03-24 | Stop reason: SDUPTHER

## 2020-09-22 NOTE — TELEPHONE ENCOUNTER
Patient called requesting refills on Metoprolol and simvastatin to [facility] mail order pharmacy. Patient made aware medications have been sent to pharmacy.

## 2020-12-08 RX ORDER — APIXABAN 5 MG/1
TABLET, FILM COATED ORAL
Qty: 60 TABLET | Refills: 11 | Status: SHIPPED | OUTPATIENT
Start: 2020-12-08 | End: 2020-12-22 | Stop reason: SDUPTHER

## 2021-01-28 ENCOUNTER — IMMUNIZATION (OUTPATIENT)
Dept: VACCINE CLINIC | Facility: HOSPITAL | Age: 86
End: 2021-01-28

## 2021-01-28 PROCEDURE — 0001A: CPT | Performed by: FAMILY MEDICINE

## 2021-01-28 PROCEDURE — 91300 HC SARSCOV02 VAC 30MCG/0.3ML IM: CPT | Performed by: FAMILY MEDICINE

## 2021-02-18 ENCOUNTER — APPOINTMENT (OUTPATIENT)
Dept: VACCINE CLINIC | Facility: HOSPITAL | Age: 86
End: 2021-02-18

## 2021-02-25 ENCOUNTER — IMMUNIZATION (OUTPATIENT)
Dept: VACCINE CLINIC | Facility: HOSPITAL | Age: 86
End: 2021-02-25

## 2021-02-25 PROCEDURE — 91300 HC SARSCOV02 VAC 30MCG/0.3ML IM: CPT | Performed by: INTERNAL MEDICINE

## 2021-02-25 PROCEDURE — 0002A: CPT | Performed by: INTERNAL MEDICINE

## 2021-03-24 ENCOUNTER — OFFICE VISIT (OUTPATIENT)
Dept: CARDIOLOGY | Facility: CLINIC | Age: 86
End: 2021-03-24

## 2021-03-24 VITALS
TEMPERATURE: 98.3 F | DIASTOLIC BLOOD PRESSURE: 72 MMHG | BODY MASS INDEX: 26.37 KG/M2 | HEART RATE: 65 BPM | WEIGHT: 184.2 LBS | HEIGHT: 70 IN | SYSTOLIC BLOOD PRESSURE: 110 MMHG

## 2021-03-24 DIAGNOSIS — Z79.01 LONG TERM (CURRENT) USE OF ANTICOAGULANTS: ICD-10-CM

## 2021-03-24 DIAGNOSIS — R01.1 CARDIAC MURMUR: ICD-10-CM

## 2021-03-24 DIAGNOSIS — Z95.0 PACEMAKER: ICD-10-CM

## 2021-03-24 DIAGNOSIS — E78.2 MIXED HYPERLIPIDEMIA: ICD-10-CM

## 2021-03-24 DIAGNOSIS — I25.9 IHD (ISCHEMIC HEART DISEASE): ICD-10-CM

## 2021-03-24 DIAGNOSIS — I48.0 PAF (PAROXYSMAL ATRIAL FIBRILLATION) (HCC): Primary | ICD-10-CM

## 2021-03-24 DIAGNOSIS — I49.5 SSS (SICK SINUS SYNDROME) (HCC): ICD-10-CM

## 2021-03-24 DIAGNOSIS — E66.3 OVERWEIGHT: ICD-10-CM

## 2021-03-24 DIAGNOSIS — I10 ESSENTIAL HYPERTENSION: ICD-10-CM

## 2021-03-24 PROCEDURE — 99214 OFFICE O/P EST MOD 30 MIN: CPT | Performed by: NURSE PRACTITIONER

## 2021-03-24 PROCEDURE — 93000 ELECTROCARDIOGRAM COMPLETE: CPT | Performed by: NURSE PRACTITIONER

## 2021-03-24 RX ORDER — SIMVASTATIN 20 MG
20 TABLET ORAL NIGHTLY
Qty: 90 TABLET | Refills: 3 | Status: SHIPPED | OUTPATIENT
Start: 2021-03-24 | End: 2022-02-15

## 2021-03-24 NOTE — PROGRESS NOTES
Chief Complaint   Patient presents with   • Follow-up     cardiac management. pt not on daily aspirin. pt states he is feeling good.   • Med Refill     will need cardiac meds refilled 90 days to [facility].   • Labs     last labs 7/2020 in chart.   • Pacemaker Check     [employer] PPM last checked 12/11/2019.       Cardiac Complaints  none      Subjective   Collin Coyle is a 85 y.o. male with HTN, hyperlipidemia, PAF, SVT, and SSS for which he underwent ablation by Dr. Canales in 2004. He later underwent pacemaker placement by Dr. Sims in 2011. For IHD, he underwent stent placement in 2004. His stress test in 2015 showed no ischemia. In November 2017 he was noted to have 5 episodes of AMS. Eliquis added. He was diagnosed with benign pneumatosis of the bowel wall, hospitalized and underwent emergency gallbladder surgery per Dr. Monet in June 2019. He was given Kcentra, reversal agent for Eliquis, Most recent stress and echo from 2020 showed EF stable at 59% without ischemia, mild MR, mild AR, KEARA 1.8cm2.       On 9/11/2019 [employer] device interrogation showed 59% atrial and 97% ventricular pacing.  220 mode switches were noted with a max of 3.2 days.  Generator had reached WILLIS. On 9/27/2019 he underwent generator change out by Dr. Cruz without problem.  Pacemaker BSC check on 9/2020 showed 99% ventricular pacing and several runs of SVT, lopressor increased to 25mg BID, 7 years of battery life remaining.    Patient returns today for follow up and denies any new concerns. Chest pain, SOA, palpitations, dizziness, and syncope denied. Labs have been done with PCP July 2020 and are to be rechecked with PCP again soon. He denies any bleeding on current anticoagulant therapy and has tolerated the increase of toprol therapy. Refills requested for 90 day supply.      Cardiac History  Past Surgical History:   Procedure Laterality Date   • CARDIAC ABLATION  2004    Dr. Canales   • CARDIAC CATHETERIZATION   06/29/2004    70% LAD. RTA & 2.5x20, 3.0x20 taxus in LAD   • CARDIOVASCULAR STRESS TEST  04/11/2007    7 min, 93% THR. Negative   • CARDIOVASCULAR STRESS TEST  07/14/2009    7 min, 89% THR. Negative   • CARDIOVASCULAR STRESS TEST  09/12/2012    6 min, 97% THR, 140/72, small inferior infarct   • CARDIOVASCULAR STRESS TEST  10/19/2015    EF 65%, no ischemic burden   • CARDIOVASCULAR STRESS TEST  08/26/2020    Lexiscan- EF 59%. Diaphramatic attenuation   • CONVERTED (HISTORICAL) HOLTER  07/11/2003    NSR-72 bpm, one short run of SVT noted   • CONVERTED (HISTORICAL) HOLTER  03/03/2011    AVG HR 59 bpm. 213 pauses   • ECHO - CONVERTED  10/06/2008    EF >60%, AO root- 4.0cm   • ECHO - CONVERTED  07/14/2009    EF >60%, PA pressure 40.29 mmHg, AO root 4.3 cm   • ECHO - CONVERTED  06/08/2011    EF 50-55%, mild AR and MR, RVSP 40.47 mmHg   • ECHO - CONVERTED  10/19/2015    EF 50-55%, mild MR, mild AR, AO 3.8 cm2   • ECHO - CONVERTED  04/17/2017    EF 50-55%, mild MR, RVSP 26 mmHg   • ECHO - CONVERTED  03/28/2019    [facility]- TLS- EF 55%, diastolic dysfunction, mild MR, PA pressure 37 mmHg   • ECHO - CONVERTED  08/26/2020    TLS. EF 55%. KEARA- 1.8 Cm2. LA- 3.8 Cm. Mild MR & AI. RVSP- 23 mmHg   • PACEMAKER IMPLANTATION  03/17/2011    [employer]. Dr. Sims.    • US CAROTID UNILATERAL  04/05/2013    no hemodynamically sig stenosis       Current Outpatient Medications   Medication Sig Dispense Refill   • apixaban (Eliquis) 5 MG tablet tablet Take 1 tablet by mouth 2 (Two) Times a Day. 180 tablet 2   • metoprolol tartrate (LOPRESSOR) 25 MG tablet Take 1 tablet by mouth 2 (Two) Times a Day. 180 tablet 3   • Multiple Vitamin (MULTI VITAMIN PO) Take  by mouth Daily.     • simvastatin (ZOCOR) 20 MG tablet Take 1 tablet by mouth Every Night. 90 tablet 3   • URSODIOL PO Take 500 mg by mouth 2 (Two) Times a Day. Takes 2 tablet BID        No current facility-administered medications for this visit.       Patient has no known  "allergies.    Past Medical History:   Diagnosis Date   • Cholelithiasis    • H/O cataract extraction     bilateral   • H/O hernia repair    • Hx of appendectomy    • Hypercholesterolemia    • Hyperlipidemia    • Hypertension    • IHD (ischemic heart disease)        Social History     Socioeconomic History   • Marital status:      Spouse name: Not on file   • Number of children: Not on file   • Years of education: Not on file   • Highest education level: Not on file   Tobacco Use   • Smoking status: Former Smoker     Quit date:      Years since quittin.2   • Smokeless tobacco: Never Used   Vaping Use   • Vaping Use: Never used   Substance and Sexual Activity   • Alcohol use: No   • Drug use: No       Family History   Problem Relation Age of Onset   • Heart disease Mother    • Hyperlipidemia Mother    • Hypertension Mother    • Cancer Father    • Heart disease Sister        Review of Systems   Constitutional: Negative for malaise/fatigue and night sweats.   Cardiovascular: Negative for chest pain, claudication, dyspnea on exertion, irregular heartbeat, leg swelling, near-syncope, orthopnea, palpitations and syncope.   Respiratory: Negative for cough, shortness of breath and wheezing.    Musculoskeletal: Positive for stiffness. Negative for back pain and joint pain.   Gastrointestinal: Negative for anorexia, heartburn, melena, nausea and vomiting.   Genitourinary: Negative for dysuria, hematuria, hesitancy and nocturia.   Neurological: Negative for dizziness, light-headedness and loss of balance.   Psychiatric/Behavioral: Negative for depression and memory loss. The patient is not nervous/anxious.            Objective     /72 (BP Location: Left arm)   Pulse 65   Temp 98.3 °F (36.8 °C)   Ht 177.8 cm (70\")   Wt 83.6 kg (184 lb 3.2 oz)   BMI 26.43 kg/m²     Constitutional:       Appearance: Healthy appearance. Not in distress.   Eyes:      Pupils: Pupils are equal, round, and reactive to light. "   HENT:      Nose: Nose normal.   Pulmonary:      Effort: Pulmonary effort is normal.      Breath sounds: Normal breath sounds.   Cardiovascular:      PMI at left midclavicular line. Normal rate. Regular rhythm.      Murmurs: There is a systolic murmur.   Abdominal:      Palpations: Abdomen is soft.   Musculoskeletal: Normal range of motion.      Cervical back: Normal range of motion and neck supple. Skin:     General: Skin is warm and dry.   Neurological:      Mental Status: Oriented to person, place and time.           ECG 12 Lead    Date/Time: 3/24/2021 2:23 PM  Performed by: Corrine Covarrubias APRN  Authorized by: Corrine Covarrubias APRN   Comparison: compared with previous ECG from 8/14/2020  Rhythm: paced  BPM: 65    Clinical impression: abnormal EKG            Assessment/Plan     IHD:  Status stable. No new concerns are voiced. No repeat workup will be recommended as status stable.  Most recent workup in 2020 showed no ischemic burden, results discussed with patient.    HTN:  Well managed no current. Same dosing of BB therapy continued. Limited sodium advised.     PAF/SSS:  EKG done today in regards shows a ventricular paced rhythm with normal QT. He will remain on metoprolol for rate control. Anticoagulation with eliquis will be continued, bleeding and bruising denied.  Samples provided.  Repeat [facility] pacer check advised.    SOA:  Reported but no worse than prior. Most recent echo showed RVSP stable and improved from prior.     Hyperlipidemia:  On statin therapy with simvastatin, tolerating well. FLP followed by your office. Can we have for review?  Same continued.    BMI noted 26.43, good cardiac diet with limited carbs, calories, and activity as tolerated advised.     Refills per request    6 month follow up recommended or sooner if needed.             Problems Addressed this Visit        Cardiac and Vasculature    IHD (ischemic heart disease)    Relevant Medications    metoprolol tartrate (LOPRESSOR) 25 MG  tablet    SSS (sick sinus syndrome) (CMS/[facility])    Relevant Medications    metoprolol tartrate (LOPRESSOR) 25 MG tablet    HTN (hypertension)    Relevant Medications    metoprolol tartrate (LOPRESSOR) 25 MG tablet    Hyperlipemia    Relevant Medications    simvastatin (ZOCOR) 20 MG tablet    Pacemaker    Cardiac murmur    PAF (paroxysmal atrial fibrillation) (CMS/[facility]) - Primary    Relevant Medications    metoprolol tartrate (LOPRESSOR) 25 MG tablet      Other Visit Diagnoses     Long term (current) use of anticoagulants        Overweight          Diagnoses       Codes Comments    PAF (paroxysmal atrial fibrillation) (CMS/[facility])    -  Primary ICD-10-CM: I48.0  ICD-9-CM: 427.31     IHD (ischemic heart disease)     ICD-10-CM: I25.9  ICD-9-CM: 414.9     SSS (sick sinus syndrome) (CMS/[facility])     ICD-10-CM: I49.5  ICD-9-CM: 427.81     Essential hypertension     ICD-10-CM: I10  ICD-9-CM: 401.9     Mixed hyperlipidemia     ICD-10-CM: E78.2  ICD-9-CM: 272.2     Cardiac murmur     ICD-10-CM: R01.1  ICD-9-CM: 785.2     Pacemaker     ICD-10-CM: Z95.0  ICD-9-CM: V45.01     Long term (current) use of anticoagulants     ICD-10-CM: Z79.01  ICD-9-CM: V58.61     Overweight     ICD-10-CM: E66.3  ICD-9-CM: 278.02           Patient's Body mass index is 26.43 kg/m². BMI is above normal parameters. Recommendations include: nutrition counseling.            Electronically signed by NANO Pitt March 24, 2021 16:05 EDT

## 2021-04-14 ENCOUNTER — OFFICE VISIT (OUTPATIENT)
Dept: CARDIOLOGY | Facility: CLINIC | Age: 86
End: 2021-04-14

## 2021-04-14 DIAGNOSIS — I25.9 IHD (ISCHEMIC HEART DISEASE): ICD-10-CM

## 2021-04-14 DIAGNOSIS — I49.5 SSS (SICK SINUS SYNDROME) (HCC): Primary | ICD-10-CM

## 2021-04-14 PROCEDURE — 93288 INTERROG EVL PM/LDLS PM IP: CPT | Performed by: INTERNAL MEDICINE

## 2021-10-25 ENCOUNTER — OFFICE VISIT (OUTPATIENT)
Dept: CARDIOLOGY | Facility: CLINIC | Age: 86
End: 2021-10-25

## 2021-10-25 VITALS
TEMPERATURE: 97.3 F | DIASTOLIC BLOOD PRESSURE: 76 MMHG | HEIGHT: 70 IN | BODY MASS INDEX: 24.45 KG/M2 | SYSTOLIC BLOOD PRESSURE: 134 MMHG | WEIGHT: 170.8 LBS | HEART RATE: 65 BPM

## 2021-10-25 DIAGNOSIS — I25.9 IHD (ISCHEMIC HEART DISEASE): ICD-10-CM

## 2021-10-25 DIAGNOSIS — R01.1 CARDIAC MURMUR: ICD-10-CM

## 2021-10-25 DIAGNOSIS — I10 PRIMARY HYPERTENSION: ICD-10-CM

## 2021-10-25 DIAGNOSIS — E78.2 MIXED HYPERLIPIDEMIA: ICD-10-CM

## 2021-10-25 DIAGNOSIS — I49.5 SSS (SICK SINUS SYNDROME) (HCC): Primary | ICD-10-CM

## 2021-10-25 DIAGNOSIS — Z98.890 H/O CARDIAC RADIOFREQUENCY ABLATION: ICD-10-CM

## 2021-10-25 DIAGNOSIS — I48.0 PAF (PAROXYSMAL ATRIAL FIBRILLATION) (HCC): ICD-10-CM

## 2021-10-25 DIAGNOSIS — Z95.0 PACEMAKER: ICD-10-CM

## 2021-10-25 DIAGNOSIS — Z79.01 CURRENT USE OF LONG TERM ANTICOAGULATION: ICD-10-CM

## 2021-10-25 PROCEDURE — 93000 ELECTROCARDIOGRAM COMPLETE: CPT | Performed by: NURSE PRACTITIONER

## 2021-10-25 PROCEDURE — 99214 OFFICE O/P EST MOD 30 MIN: CPT | Performed by: NURSE PRACTITIONER

## 2021-10-25 NOTE — PROGRESS NOTES
Chief Complaint   Patient presents with   • Follow-up     Pt here for cardiac follow up.  He denies CP, SOB, dizziness or palpitations.  Pt does not take a daily ASA due to being on Eliquis.   • Med Refill     Pt needs cardiac meds refilled, he request 90 day rxs.   • Lab Work     Last labs completed 10/22/21, pt brought copy in today.   • Samples     Pt is asking for samples of Eliquis.  Left at        Cardiac Complaints  none      Subjective   Collin Coyle is a 86 y.o. male with HTN, hyperlipidemia, PAF, SVT, and SSS for which he underwent ablation by Dr. Canales in 2004. He later underwent pacemaker placement by Dr. Sims in 2011. For IHD, he underwent stent placement in 2004. His stress test in 2015 showed no ischemia. In November 2017 he was noted to have 5 episodes of AMS. Eliquis added. He was diagnosed with benign pneumatosis of the bowel wall, hospitalized and underwent emergency gallbladder surgery per Dr. Monet in June 2019. He was given Kcentra, reversal agent for Eliquis, Most recent stress and echo from 2020 showed EF stable at 59% without ischemia, mild MR, mild AR, KEARA 1.8cm2.       On 9/11/2019 [employer] device interrogation showed 59% atrial and 97% ventricular pacing.  220 mode switches were noted with a max of 3.2 days.  Generator had reached WILLIS. On 9/27/2019 he underwent generator change out by Dr. Cruz without problem. Most recent [facility] pacer check from April 2021 showed 6 years of battery life, 88% atrial pacing, and 100% ventricular pacing.     He returns today for follow up and denies any new concerns. Chest pain, SOA, palpitations, dizziness, and syncope denied. No bleeding or bruising denied. Labs remain followed by PCP and last checked 10/2021:  HH 13.7/42.9, BUN 19, Creatinine 1.1, Na 143, K 4.5, GFR 67.6, TRIG 60, LDL 34, HDL 47, AIC 6.0%. Samples of eliquis therapy advised.         Cardiac History  Past Surgical History:   Procedure Laterality Date   • CARDIAC  ABLATION  2004    Dr. Canales   • CARDIAC CATHETERIZATION  06/29/2004    70% LAD. RTA & 2.5x20, 3.0x20 taxus in LAD   • CARDIOVASCULAR STRESS TEST  04/11/2007    7 min, 93% THR. Negative   • CARDIOVASCULAR STRESS TEST  07/14/2009    7 min, 89% THR. Negative   • CARDIOVASCULAR STRESS TEST  09/12/2012    6 min, 97% THR, 140/72, small inferior infarct   • CARDIOVASCULAR STRESS TEST  10/19/2015    EF 65%, no ischemic burden   • CARDIOVASCULAR STRESS TEST  08/26/2020    Lexiscan- EF 59%. Diaphramatic attenuation   • CONVERTED (HISTORICAL) HOLTER  07/11/2003    NSR-72 bpm, one short run of SVT noted   • CONVERTED (HISTORICAL) HOLTER  03/03/2011    AVG HR 59 bpm. 213 pauses   • ECHO - CONVERTED  10/06/2008    EF >60%, AO root- 4.0cm   • ECHO - CONVERTED  07/14/2009    EF >60%, PA pressure 40.29 mmHg, AO root 4.3 cm   • ECHO - CONVERTED  06/08/2011    EF 50-55%, mild AR and MR, RVSP 40.47 mmHg   • ECHO - CONVERTED  10/19/2015    EF 50-55%, mild MR, mild AR, AO 3.8 cm2   • ECHO - CONVERTED  04/17/2017    EF 50-55%, mild MR, RVSP 26 mmHg   • ECHO - CONVERTED  03/28/2019    [facility]- [facility]- EF 55%, diastolic dysfunction, mild MR, PA pressure 37 mmHg   • ECHO - CONVERTED  08/26/2020    TLS. EF 55%. KEARA- 1.8 Cm2. LA- 3.8 Cm. Mild MR & AI. RVSP- 23 mmHg   • PACEMAKER IMPLANTATION  03/17/2011    [employer]. Dr. Sims.    • US CAROTID UNILATERAL  04/05/2013    no hemodynamically sig stenosis       Current Outpatient Medications   Medication Sig Dispense Refill   • apixaban (Eliquis) 5 MG tablet tablet Take 1 tablet by mouth 2 (Two) Times a Day. 14 tablet 0   • metoprolol tartrate (LOPRESSOR) 25 MG tablet Take 1 tablet by mouth 2 (Two) Times a Day. 180 tablet 3   • Multiple Vitamin (MULTI VITAMIN PO) Take  by mouth Daily.     • simvastatin (ZOCOR) 20 MG tablet Take 1 tablet by mouth Every Night. 90 tablet 3   • URSODIOL PO Take 500 mg by mouth 2 (Two) Times a Day. Takes 2 tablet BID        No current facility-administered  "medications for this visit.       Patient has no known allergies.    Past Medical History:   Diagnosis Date   • Cholelithiasis    • H/O cataract extraction     bilateral   • H/O hernia repair    • Hx of appendectomy    • Hypercholesterolemia    • Hyperlipidemia    • Hypertension    • IHD (ischemic heart disease)        Social History     Socioeconomic History   • Marital status:    Tobacco Use   • Smoking status: Former Smoker     Quit date:      Years since quittin.8   • Smokeless tobacco: Never Used   Vaping Use   • Vaping Use: Never used   Substance and Sexual Activity   • Alcohol use: No   • Drug use: No       Family History   Problem Relation Age of Onset   • Heart disease Mother    • Hyperlipidemia Mother    • Hypertension Mother    • Cancer Father    • Heart disease Sister        Review of Systems   Constitutional: Negative for malaise/fatigue and night sweats.   Cardiovascular: Negative for chest pain, claudication, dyspnea on exertion, irregular heartbeat, leg swelling, near-syncope, orthopnea, palpitations and syncope.   Respiratory: Negative for cough, shortness of breath and wheezing.    Musculoskeletal: Positive for stiffness. Negative for back pain and joint pain.   Gastrointestinal: Negative for anorexia, heartburn, melena, nausea and vomiting.   Genitourinary: Negative for dysuria, hematuria, hesitancy and nocturia.   Neurological: Negative for dizziness, light-headedness and loss of balance.   Psychiatric/Behavioral: Negative for depression and memory loss. The patient is not nervous/anxious.            Objective     /76 (BP Location: Left arm, Patient Position: Sitting)   Pulse 65   Temp 97.3 °F (36.3 °C)   Ht 177.8 cm (70\")   Wt 77.5 kg (170 lb 12.8 oz)   BMI 24.51 kg/m²     Constitutional:       Appearance: Frail.   Eyes:      Pupils: Pupils are equal, round, and reactive to light.   HENT:      Nose: Nose normal.   Pulmonary:      Effort: Pulmonary effort is normal.     "  Breath sounds: Normal breath sounds.   Cardiovascular:      PMI at left midclavicular line. Normal rate. Regular rhythm.      Murmurs: There is a systolic murmur.   Abdominal:      Palpations: Abdomen is soft.   Musculoskeletal: Normal range of motion.      Cervical back: Normal range of motion and neck supple. Skin:     General: Skin is warm and dry.   Neurological:      Mental Status: Oriented to person, place and time.           ECG 12 Lead    Date/Time: 10/25/2021 2:00 PM  Performed by: Corrine Covarrubias APRN  Authorized by: Corrine Covarrubias APRN   Comparison: compared with previous ECG from 3/24/2021  Similar to previous ECG  Rhythm: paced  BPM: 65    Clinical impression: abnormal EKG  Comments: Normal QT            Assessment/Plan     IHD:  Status stable. No new concerns are voiced. No repeat workup will be recommended as status stable.  Most recent workup in 2020 showed no ischemic burden.     HTN:  Well managed on current. Same dosing of BB therapy continued. Limited sodium advised.      PAF/SSS:  EKG done today in regards shows an atrial and  ventricular paced rhythm with normal QT. He will remain on metoprolol for rate control. Anticoagulation with eliquis will be continued, bleeding and bruising denied.  Samples provided.  Repeat [facility] pacer check advised.     SOA: Denied. Most recent echo showed RVSP stable and improved from prior.      Hyperlipidemia:  On statin therapy with simvastatin, tolerating well. FLP followed by your office. Can we have for review?  Same continued.     BMI noted 24.51, good cardiac diet with limited carbs, calories, and activity as tolerated advised.      Refills per request     6 month follow up recommended or sooner if needed.         Problems Addressed this Visit        Cardiac and Vasculature    IHD (ischemic heart disease)    SSS (sick sinus syndrome) (HCC) - Primary    Relevant Orders    ECG 12 Lead    HTN (hypertension)    Hyperlipemia    Pacemaker    H/O cardiac  radiofrequency ablation    Cardiac murmur    PAF (paroxysmal atrial fibrillation) ([facility])      Other Visit Diagnoses     Current use of long term anticoagulation          Diagnoses       Codes Comments    SSS (sick sinus syndrome) ([facility])    -  Primary ICD-10-CM: I49.5  ICD-9-CM: 427.81     PAF (paroxysmal atrial fibrillation) ([facility])     ICD-10-CM: I48.0  ICD-9-CM: 427.31     H/O cardiac radiofrequency ablation     ICD-10-CM: Z98.890  ICD-9-CM: V15.1     IHD (ischemic heart disease)     ICD-10-CM: I25.9  ICD-9-CM: 414.9     Primary hypertension     ICD-10-CM: I10  ICD-9-CM: 401.9     Mixed hyperlipidemia     ICD-10-CM: E78.2  ICD-9-CM: 272.2     Cardiac murmur     ICD-10-CM: R01.1  ICD-9-CM: 785.2     Pacemaker     ICD-10-CM: Z95.0  ICD-9-CM: V45.01     Current use of long term anticoagulation     ICD-10-CM: Z79.01  ICD-9-CM: V58.61           Patient's Body mass index is 24.51 kg/m². indicating that he is WNW, continued cardiac diet with limited carbs, calories, and activity as tolerated advised.         Electronically signed by NANO Pitt October 25, 2021 13:57 EDT

## 2021-10-27 ENCOUNTER — OFFICE VISIT (OUTPATIENT)
Dept: CARDIOLOGY | Facility: CLINIC | Age: 86
End: 2021-10-27

## 2021-10-27 DIAGNOSIS — I49.5 SSS (SICK SINUS SYNDROME) (HCC): Primary | ICD-10-CM

## 2021-10-27 DIAGNOSIS — I25.9 IHD (ISCHEMIC HEART DISEASE): ICD-10-CM

## 2021-10-27 PROCEDURE — 93288 INTERROG EVL PM/LDLS PM IP: CPT | Performed by: INTERNAL MEDICINE

## 2022-02-15 RX ORDER — SIMVASTATIN 20 MG
20 TABLET ORAL NIGHTLY
Qty: 90 TABLET | Refills: 3 | Status: SHIPPED | OUTPATIENT
Start: 2022-02-15